# Patient Record
Sex: FEMALE | URBAN - METROPOLITAN AREA
[De-identification: names, ages, dates, MRNs, and addresses within clinical notes are randomized per-mention and may not be internally consistent; named-entity substitution may affect disease eponyms.]

---

## 2017-06-08 PROBLEM — Z00.00 ENCOUNTER FOR PREVENTIVE HEALTH EXAMINATION: Status: ACTIVE | Noted: 2017-06-08

## 2017-06-09 ENCOUNTER — OUTPATIENT (OUTPATIENT)
Dept: OUTPATIENT SERVICES | Facility: HOSPITAL | Age: 62
LOS: 1 days | End: 2017-06-09
Payer: COMMERCIAL

## 2017-06-09 ENCOUNTER — APPOINTMENT (OUTPATIENT)
Dept: ULTRASOUND IMAGING | Facility: CLINIC | Age: 62
End: 2017-06-09

## 2017-06-09 DIAGNOSIS — Z00.8 ENCOUNTER FOR OTHER GENERAL EXAMINATION: ICD-10-CM

## 2017-06-09 PROCEDURE — 76700 US EXAM ABDOM COMPLETE: CPT

## 2017-06-16 ENCOUNTER — RESULT REVIEW (OUTPATIENT)
Age: 62
End: 2017-06-16

## 2017-06-16 ENCOUNTER — INPATIENT (INPATIENT)
Facility: HOSPITAL | Age: 62
LOS: 2 days | Discharge: HOME | End: 2017-06-19
Attending: INTERNAL MEDICINE | Admitting: INTERNAL MEDICINE
Payer: COMMERCIAL

## 2017-06-16 VITALS — WEIGHT: 130.07 LBS | HEIGHT: 68 IN

## 2017-06-16 DIAGNOSIS — Z29.9 ENCOUNTER FOR PROPHYLACTIC MEASURES, UNSPECIFIED: ICD-10-CM

## 2017-06-16 DIAGNOSIS — R14.0 ABDOMINAL DISTENSION (GASEOUS): ICD-10-CM

## 2017-06-16 DIAGNOSIS — R63.4 ABNORMAL WEIGHT LOSS: ICD-10-CM

## 2017-06-16 DIAGNOSIS — Z90.89 ACQUIRED ABSENCE OF OTHER ORGANS: Chronic | ICD-10-CM

## 2017-06-16 LAB
ALBUMIN SERPL ELPH-MCNC: 2.8 G/DL — LOW (ref 3.3–5)
ALP SERPL-CCNC: 88 U/L — SIGNIFICANT CHANGE UP (ref 40–120)
ALT FLD-CCNC: 9 U/L — LOW (ref 12–78)
ANION GAP SERPL CALC-SCNC: 6 MMOL/L — SIGNIFICANT CHANGE UP (ref 5–17)
APPEARANCE UR: (no result)
APTT BLD: 28.9 SEC — SIGNIFICANT CHANGE UP (ref 27.5–37.4)
AST SERPL-CCNC: 24 U/L — SIGNIFICANT CHANGE UP (ref 15–37)
BACTERIA # UR AUTO: (no result)
BASOPHILS # BLD AUTO: 0 K/UL — SIGNIFICANT CHANGE UP (ref 0–0.2)
BASOPHILS NFR BLD AUTO: 0.4 % — SIGNIFICANT CHANGE UP (ref 0–2)
BILIRUB SERPL-MCNC: 0.5 MG/DL — SIGNIFICANT CHANGE UP (ref 0.2–1.2)
BILIRUB UR-MCNC: NEGATIVE — SIGNIFICANT CHANGE UP
BLD GP AB SCN SERPL QL: SIGNIFICANT CHANGE UP
BUN SERPL-MCNC: 9 MG/DL — SIGNIFICANT CHANGE UP (ref 7–23)
CALCIUM SERPL-MCNC: 8.8 MG/DL — SIGNIFICANT CHANGE UP (ref 8.5–10.1)
CHLORIDE SERPL-SCNC: 101 MMOL/L — SIGNIFICANT CHANGE UP (ref 96–108)
CO2 SERPL-SCNC: 28 MMOL/L — SIGNIFICANT CHANGE UP (ref 22–31)
COD CRY URNS QL: (no result)
COLOR SPEC: (no result)
CREAT SERPL-MCNC: 0.68 MG/DL — SIGNIFICANT CHANGE UP (ref 0.5–1.3)
DIFF PNL FLD: (no result)
EOSINOPHIL # BLD AUTO: 0.1 K/UL — SIGNIFICANT CHANGE UP (ref 0–0.5)
EOSINOPHIL NFR BLD AUTO: 0.9 % — SIGNIFICANT CHANGE UP (ref 0–6)
EPI CELLS # UR: SIGNIFICANT CHANGE UP
GLUCOSE SERPL-MCNC: 117 MG/DL — HIGH (ref 70–99)
GLUCOSE UR QL: NEGATIVE MG/DL — SIGNIFICANT CHANGE UP
HCT VFR BLD CALC: 34.2 % — LOW (ref 34.5–45)
HGB BLD-MCNC: 11.2 G/DL — LOW (ref 11.5–15.5)
INR BLD: 1.2 RATIO — HIGH (ref 0.88–1.16)
KETONES UR-MCNC: (no result)
LEUKOCYTE ESTERASE UR-ACNC: (no result)
LYMPHOCYTES # BLD AUTO: 0.6 K/UL — LOW (ref 1–3.3)
LYMPHOCYTES # BLD AUTO: 6.7 % — LOW (ref 13–44)
MCHC RBC-ENTMCNC: 27.2 PG — SIGNIFICANT CHANGE UP (ref 27–34)
MCHC RBC-ENTMCNC: 32.8 GM/DL — SIGNIFICANT CHANGE UP (ref 32–36)
MCV RBC AUTO: 82.8 FL — SIGNIFICANT CHANGE UP (ref 80–100)
MONOCYTES # BLD AUTO: 0.3 K/UL — SIGNIFICANT CHANGE UP (ref 0–0.9)
MONOCYTES NFR BLD AUTO: 3.5 % — SIGNIFICANT CHANGE UP (ref 2–14)
NEUTROPHILS # BLD AUTO: 8.2 K/UL — HIGH (ref 1.8–7.4)
NEUTROPHILS NFR BLD AUTO: 88.4 % — HIGH (ref 43–77)
NITRITE UR-MCNC: NEGATIVE — SIGNIFICANT CHANGE UP
PH UR: 5 — SIGNIFICANT CHANGE UP (ref 5–8)
PLATELET # BLD AUTO: 358 K/UL — SIGNIFICANT CHANGE UP (ref 150–400)
POTASSIUM SERPL-MCNC: 4.3 MMOL/L — SIGNIFICANT CHANGE UP (ref 3.5–5.3)
POTASSIUM SERPL-SCNC: 4.3 MMOL/L — SIGNIFICANT CHANGE UP (ref 3.5–5.3)
PROT SERPL-MCNC: 7.5 GM/DL — SIGNIFICANT CHANGE UP (ref 6–8.3)
PROT UR-MCNC: 30 MG/DL
PROTHROM AB SERPL-ACNC: 13 SEC — HIGH (ref 9.8–12.7)
RBC # BLD: 4.13 M/UL — SIGNIFICANT CHANGE UP (ref 3.8–5.2)
RBC # FLD: 12.9 % — SIGNIFICANT CHANGE UP (ref 10.3–14.5)
RBC CASTS # UR COMP ASSIST: SIGNIFICANT CHANGE UP /HPF (ref 0–4)
SODIUM SERPL-SCNC: 135 MMOL/L — SIGNIFICANT CHANGE UP (ref 135–145)
SP GR SPEC: 1.02 — SIGNIFICANT CHANGE UP (ref 1.01–1.02)
TYPE + AB SCN PNL BLD: SIGNIFICANT CHANGE UP
UROBILINOGEN FLD QL: 1 MG/DL
WBC # BLD: 9.3 K/UL — SIGNIFICANT CHANGE UP (ref 3.8–10.5)
WBC # FLD AUTO: 9.3 K/UL — SIGNIFICANT CHANGE UP (ref 3.8–10.5)
WBC UR QL: SIGNIFICANT CHANGE UP

## 2017-06-16 PROCEDURE — 74177 CT ABD & PELVIS W/CONTRAST: CPT | Mod: 26

## 2017-06-16 PROCEDURE — 71020: CPT | Mod: 26

## 2017-06-16 PROCEDURE — 99285 EMERGENCY DEPT VISIT HI MDM: CPT

## 2017-06-16 PROCEDURE — 71250 CT THORAX DX C-: CPT | Mod: 26

## 2017-06-16 RX ORDER — ONDANSETRON 8 MG/1
4 TABLET, FILM COATED ORAL EVERY 6 HOURS
Qty: 0 | Refills: 0 | Status: DISCONTINUED | OUTPATIENT
Start: 2017-06-16 | End: 2017-06-19

## 2017-06-16 RX ORDER — SENNA PLUS 8.6 MG/1
2 TABLET ORAL AT BEDTIME
Qty: 0 | Refills: 0 | Status: DISCONTINUED | OUTPATIENT
Start: 2017-06-16 | End: 2017-06-19

## 2017-06-16 RX ORDER — ACETAMINOPHEN 500 MG
650 TABLET ORAL EVERY 6 HOURS
Qty: 0 | Refills: 0 | Status: DISCONTINUED | OUTPATIENT
Start: 2017-06-16 | End: 2017-06-19

## 2017-06-16 RX ORDER — HEPARIN SODIUM 5000 [USP'U]/ML
5000 INJECTION INTRAVENOUS; SUBCUTANEOUS EVERY 8 HOURS
Qty: 0 | Refills: 0 | Status: DISCONTINUED | OUTPATIENT
Start: 2017-06-16 | End: 2017-06-19

## 2017-06-16 RX ORDER — DOCUSATE SODIUM 100 MG
100 CAPSULE ORAL THREE TIMES A DAY
Qty: 0 | Refills: 0 | Status: DISCONTINUED | OUTPATIENT
Start: 2017-06-16 | End: 2017-06-19

## 2017-06-16 RX ADMIN — HEPARIN SODIUM 5000 UNIT(S): 5000 INJECTION INTRAVENOUS; SUBCUTANEOUS at 22:07

## 2017-06-16 NOTE — H&P ADULT - ASSESSMENT
62 y/o F w/ no known PMHx was referred to the ED by her GYN for further evaluation of abdominal distention, fatigue, and weight loss (>10lbs.) over the last several months. CT Chest/ABD/Pel in the ED revealed evidence for metastatic disease; large B/L ovarian masses with widespread peritoneal carcinomatosis, massive ascites, and B/L lower lobe nodules. The patient denies any associated  hematochezia, melena, dysuria, fever, chills, or n/v/d.

## 2017-06-16 NOTE — ED STATDOCS - PROGRESS NOTE DETAILS
61 yr. old female PMH:  presents r to ED with abdominal distention for months worsening. Reports fatigue Seen by GYN today and instructed to go to ED for further evaluation. Seen and examined by attending in Intake. Plan: IV, fluids and labs CT abd/pelvis. Will F/U with results and re evaluate. Patient informed of results of CT revealing ovarian masses and ascites. Informed of admission for work up with Dr. Boston. Josefa MAN 61 yr. old female PMH:  presents r to ED with abdominal distention for several months and worsening. Reports fatigue Seen by GYN today and instructed to go to ED for further evaluation. No fever or chills. No abdominal pain. No N/V or diarrhea. No blood in stools. No dysuria. Seen and examined by attending in Intake. Plan: IV, fluids and labs CT abd/pelvis. Will F/U with results and re evaluate.

## 2017-06-16 NOTE — H&P ADULT - NSHPPHYSICALEXAM_GEN_ALL_CORE
Vital Signs Last 24 Hrs  T(C): 36.4, Max: 36.4 (06-16 @ 13:27)  T(F): 97.6, Max: 97.6 (06-16 @ 13:27)  HR: 117 (117 - 117)  BP: 141/80 (141/80 - 141/80)  RR: 18 (18 - 18)  SpO2: 100% (100% - 100%)

## 2017-06-16 NOTE — ED STATDOCS - OBJECTIVE STATEMENT
62 y/o F presents to the ED c/o abd distention x months. Pt also c/o fatigue. Pt has had worsening abd distention for several months, went to see GYN today and was told to come to ED for further evaluation. She also notes 10 lb weight loss in past couple of months. Currently pt has no other complaints and denies blood in stool, melena, dysuria, fever, chills, and n/v/d.

## 2017-06-16 NOTE — ED STATDOCS - ATTENDING CONTRIBUTION TO CARE
I, Marty Almanza, performed the initial face to face bedside interview with this patient regarding history of present illness, review of symptoms and relevant past medical, social and family history.  I completed an independent physical examination.  I was the initial provider who evaluated this patient. I have signed out the follow up of any pending tests (i.e. labs, radiological studies) to the ACP.  I have communicated the patient’s plan of care and disposition with the ACP.  The history, relevant review of systems, past medical and surgical history, medical decision making, and physical examination was documented by the scribe in my presence and I attest to the accuracy of the documentation.

## 2017-06-16 NOTE — H&P ADULT - HISTORY OF PRESENT ILLNESS
60 y/o F w/ no known PMHx was referred to the ED by her GYN for further evaluation of abdominal distention, fatigue, and weight loss (>10lbs.) over the last several months. CT Chest/ABD/Pel in the ED revealed evidence for metastatic disease; large B/L ovarian massess with widespread peritoneal carcinomatosis, massive ascites, and B/L lower lobe nodules. The patient denies any associated  hematochezia, melena, dysuria, fever, chills, or n/v/d.

## 2017-06-16 NOTE — H&P ADULT - PROBLEM SELECTOR PLAN 1
~admit to Medicine  ~f/u w/ GYN/ONC ~admit to Medicine  ~f/u w/ Oncology  ~f/u CEA  ~f/u CA-125  ~plan is for possible (CT vs. U/S) guided biopsy on Monday  ~NPO after MN on Sunday  ~Palliative care consult ~admit to Medicine  ~f/u w/ Oncology for further management of EOC with peritoneal carcinomatosis.  ~f/u CEA  ~f/u CA-125  ~plan is for possible (CT vs. U/S) guided biopsy on Monday   ~NPO after MN on Sunday  ~Palliative care consult

## 2017-06-16 NOTE — ED STATDOCS - MEDICAL DECISION MAKING DETAILS
suspect ovarian cancer given weight loss, ascites and abdominal pain.  Pt has no h/o alcohol abuse or liver disease.

## 2017-06-16 NOTE — ED STATDOCS - ENMT, MLM
Nasal mucosa clear.  Mouth with mildly dry mucosa  Throat has no vesicles, no oropharyngeal exudates and uvula is midline.

## 2017-06-17 DIAGNOSIS — R18.0 MALIGNANT ASCITES: ICD-10-CM

## 2017-06-17 LAB
ALBUMIN SERPL ELPH-MCNC: 2.4 G/DL — LOW (ref 3.3–5)
ALP SERPL-CCNC: 80 U/L — SIGNIFICANT CHANGE UP (ref 40–120)
ALT FLD-CCNC: 13 U/L — SIGNIFICANT CHANGE UP (ref 12–78)
ANION GAP SERPL CALC-SCNC: 7 MMOL/L — SIGNIFICANT CHANGE UP (ref 5–17)
AST SERPL-CCNC: 23 U/L — SIGNIFICANT CHANGE UP (ref 15–37)
BASOPHILS # BLD AUTO: 0.1 K/UL — SIGNIFICANT CHANGE UP (ref 0–0.2)
BASOPHILS NFR BLD AUTO: 0.8 % — SIGNIFICANT CHANGE UP (ref 0–2)
BILIRUB SERPL-MCNC: 0.6 MG/DL — SIGNIFICANT CHANGE UP (ref 0.2–1.2)
BUN SERPL-MCNC: 7 MG/DL — SIGNIFICANT CHANGE UP (ref 7–23)
CALCIUM SERPL-MCNC: 8.5 MG/DL — SIGNIFICANT CHANGE UP (ref 8.5–10.1)
CANCER AG125 SERPL-ACNC: 1934 U/ML — HIGH
CEA SERPL-MCNC: 1389 NG/ML — HIGH (ref 0–3.8)
CEA SERPL-MCNC: 1641 NG/ML — HIGH (ref 0–3.8)
CHLORIDE SERPL-SCNC: 103 MMOL/L — SIGNIFICANT CHANGE UP (ref 96–108)
CO2 SERPL-SCNC: 27 MMOL/L — SIGNIFICANT CHANGE UP (ref 22–31)
CREAT SERPL-MCNC: 0.52 MG/DL — SIGNIFICANT CHANGE UP (ref 0.5–1.3)
EOSINOPHIL # BLD AUTO: 0.1 K/UL — SIGNIFICANT CHANGE UP (ref 0–0.5)
EOSINOPHIL NFR BLD AUTO: 2.1 % — SIGNIFICANT CHANGE UP (ref 0–6)
GLUCOSE SERPL-MCNC: 91 MG/DL — SIGNIFICANT CHANGE UP (ref 70–99)
HCT VFR BLD CALC: 31.7 % — LOW (ref 34.5–45)
HGB BLD-MCNC: 10.3 G/DL — LOW (ref 11.5–15.5)
LYMPHOCYTES # BLD AUTO: 0.5 K/UL — LOW (ref 1–3.3)
LYMPHOCYTES # BLD AUTO: 8.3 % — LOW (ref 13–44)
MAGNESIUM SERPL-MCNC: 2.2 MG/DL — SIGNIFICANT CHANGE UP (ref 1.6–2.6)
MCHC RBC-ENTMCNC: 27.3 PG — SIGNIFICANT CHANGE UP (ref 27–34)
MCHC RBC-ENTMCNC: 32.6 GM/DL — SIGNIFICANT CHANGE UP (ref 32–36)
MCV RBC AUTO: 83.8 FL — SIGNIFICANT CHANGE UP (ref 80–100)
MONOCYTES # BLD AUTO: 0.3 K/UL — SIGNIFICANT CHANGE UP (ref 0–0.9)
MONOCYTES NFR BLD AUTO: 4 % — SIGNIFICANT CHANGE UP (ref 2–14)
NEUTROPHILS # BLD AUTO: 5.6 K/UL — SIGNIFICANT CHANGE UP (ref 1.8–7.4)
NEUTROPHILS NFR BLD AUTO: 84.9 % — HIGH (ref 43–77)
PLATELET # BLD AUTO: 361 K/UL — SIGNIFICANT CHANGE UP (ref 150–400)
POTASSIUM SERPL-MCNC: 4.2 MMOL/L — SIGNIFICANT CHANGE UP (ref 3.5–5.3)
POTASSIUM SERPL-SCNC: 4.2 MMOL/L — SIGNIFICANT CHANGE UP (ref 3.5–5.3)
PROT SERPL-MCNC: 6.6 GM/DL — SIGNIFICANT CHANGE UP (ref 6–8.3)
RBC # BLD: 3.79 M/UL — LOW (ref 3.8–5.2)
RBC # FLD: 13.1 % — SIGNIFICANT CHANGE UP (ref 10.3–14.5)
SODIUM SERPL-SCNC: 137 MMOL/L — SIGNIFICANT CHANGE UP (ref 135–145)
WBC # BLD: 6.6 K/UL — SIGNIFICANT CHANGE UP (ref 3.8–10.5)
WBC # FLD AUTO: 6.6 K/UL — SIGNIFICANT CHANGE UP (ref 3.8–10.5)

## 2017-06-17 RX ADMIN — HEPARIN SODIUM 5000 UNIT(S): 5000 INJECTION INTRAVENOUS; SUBCUTANEOUS at 13:26

## 2017-06-17 RX ADMIN — HEPARIN SODIUM 5000 UNIT(S): 5000 INJECTION INTRAVENOUS; SUBCUTANEOUS at 22:16

## 2017-06-17 RX ADMIN — HEPARIN SODIUM 5000 UNIT(S): 5000 INJECTION INTRAVENOUS; SUBCUTANEOUS at 05:39

## 2017-06-17 NOTE — CONSULT NOTE ADULT - ASSESSMENT
60 yo female with abdominal distension, ascites, scans revealing bilateral ovarian masses, omental metastases, ascites, pulmonary nodules; has anemia, elevated CEA;  pending; ECOG PS 2    Differential diagnosis includes ovarian cancer/ colon cancer with Krukenberg metastases, lung metastases; primary lung very unlikely.    PLAN      Stool occult blood  Iron studies  IR evaluation for paracentesis, cell block analysis, omental biopsy  GYN evaluation: seen by Dr Boyce as out patient  DVT prophylaxis

## 2017-06-17 NOTE — CHART NOTE - NSCHARTNOTEFT_GEN_A_CORE
Upon Nutritional Assessment by the Registered Dietitian your patient was determined to meet criteria has evidence of the following diagnosis/diagnoses:        [ ]  Mild Protein Calorie Malnutrition        [ ]  Moderate Protein Calorie Malnutrition        [x ] Severe Protein Calorie Malnutrition        [ ] Unspecified Protein Calorie Malnutrition    Findings as based on:  •  Comprehensive nutrition assessment and Nutrition focused physical examination  •  Insufficient food/energy intake  •  Weight loss over time(Please specify time period)  •  Loss of muscle mass  •  Loss of fat mass  •  Fluid accumulation    Findings relevant to patient:  1.severe/moderate muscle wasting  2.severe/moderate fat loss  3 edema/ascites  4. possible weight loss (masked by fluid)    Nutrition Interventions:  1. Prosource 1oz. po TID mixed with juice  2. Ensure enlive 8oz. po BID   3.     TO BE COMPLETED BY PROVIDER:          [ ] Agree:         [ ] Disagree:    Comments:

## 2017-06-17 NOTE — DIETITIAN INITIAL EVALUATION ADULT. - OTHER INFO
Nutrition consult received secondary to unintentional weight loss. Pt reports appetite good (>50%) tolerating po diet (regular). Current weight UBW however suspect weight loss secondary to ascites and edema. No diet PTA followed or supplementation consumed. Skin intact- +2 R/L foot edema, +Ascites. Denies any difficulty chewing or swallowing. NFPE reveals severe muscle wasting: Temporal, clavicle, scapula. Moderate muscle wasting: calf, patella, quads. Severe fat loss: tricep, ribs. Moderate fat loss: ocular. Pt meets criteria for severe protein/calorie malnutrition in context of chronic illness secondary to Severe muscle and fat wasting, and severe fluid accumulation. Suggest additional protein and calories w/ Ensure enlive 8oz. po BID and Prosource 1oz. po mixed with juice TID.

## 2017-06-17 NOTE — DIETITIAN INITIAL EVALUATION ADULT. - NS AS NUTRI DX NUTRIENT
Pt meets criteria for severe protein/calorie malnutrition in context of chronic illness secondary to Severe muscle and fat wasting, and severe fluid accumulation./Malnutrition

## 2017-06-18 RX ADMIN — HEPARIN SODIUM 5000 UNIT(S): 5000 INJECTION INTRAVENOUS; SUBCUTANEOUS at 15:35

## 2017-06-18 RX ADMIN — HEPARIN SODIUM 5000 UNIT(S): 5000 INJECTION INTRAVENOUS; SUBCUTANEOUS at 05:51

## 2017-06-18 RX ADMIN — HEPARIN SODIUM 5000 UNIT(S): 5000 INJECTION INTRAVENOUS; SUBCUTANEOUS at 21:03

## 2017-06-18 NOTE — GOALS OF CARE CONVERSATION - PERSONAL ADVANCE DIRECTIVE - TREATMENT GUIDELINE COMMENT
full code, interested in hearing about all interventions currently. Open to home palliative to check in for sx management should any sx arise. She would never want to be kept alive on machines or in a state where she could not interact meaningfully with her family

## 2017-06-18 NOTE — GOALS OF CARE CONVERSATION - PERSONAL ADVANCE DIRECTIVE - WHAT MATTERS MOST
Her family, comfort, honesty, and having QOL. Described watching process of parents dying (mother of cancer and then more recently her father from Parkinson's). Thus she has had time to think over what she would want for herself and has already begun to get her affairs in order for her family. She has aman in God and finds it helpful to cope. Her family is paramount and she mentions often weighing her options in regard to keeping them from being burdened.

## 2017-06-18 NOTE — CONSULT NOTE ADULT - SUBJECTIVE AND OBJECTIVE BOX
HPI:  62 y/o F w/ no known PMHx was referred to the ED by her GYN for further evaluation of abdominal distention, fatigue, and weight loss (>10lbs.) over the last several months. She was seen by Dr Boyce for bloating; GYN exam was normal although adnexa not palpable secondary to ascites. Given the degree of ascites she was advised admission for [facility] / management . CT Chest/ABD/Pel in the ED revealed evidence for metastatic disease; large B/L ovarian massess with widespread peritoneal carcinomatosis, massive ascites, and B/L lower lobe nodules. The patient denies dysphagia, reflux , change in bowel habits, blood in stools , spotting;  No hematochezia, melena, dysuria, fever, chills, or n/v/d. (2017 17:35); labs reveal normochromic and normocytic anemia, elevated CEA ;  pending      PAST MEDICAL & SURGICAL HISTORY:  History of tonsillectomy      MEDICATIONS  (STANDING):  heparin  Injectable 5000Unit(s) SubCutaneous every 8 hours    MEDICATIONS  (PRN):  acetaminophen   Tablet 650milliGRAM(s) Oral every 6 hours PRN For Temp greater than 38 C (100.4 F)  acetaminophen   Tablet. 650milliGRAM(s) Oral every 6 hours PRN Mild Pain (1 - 3)  ondansetron Injectable 4milliGRAM(s) IV Push every 6 hours PRN Nausea  senna 2Tablet(s) Oral at bedtime PRN Constipation  docusate sodium 100milliGRAM(s) Oral three times a day PRN Constipation      Allergies    No Known Allergies    Intolerances        SOCIAL HISTORY:    FAMILY HISTORY:  Family history of esophageal cancer (Mother)  Family history of Parkinson disease (Father)  Family history of prostate cancer (Father)  Family history of hypertension (Father)  No pertinent family history in first degree relatives      REVIEW OF SYSTEMS      General: Good appetite,10 lb  weight loss;  active;  able to care for self    Skin: No rash, no pruritis, no hives  	  Ophthalmologic: No visual blurring, no diplopia  	  HEENT: No neck pains, sore throat, hearing difficulty, tinnitus    Respiratory and Thorax:  No dyspnea, cough, sputum production, hoarseness, hemoptysis. No pleurisy, chest pains  	  Cardiovascular: No palpitations, chest pains, dyspnea on exertion; no PND, orthopnea    Gastrointestinal:  bloating. No reflux symptoms, dysphagia; No change in bowel habits,   diarrhea,constipation;No blood in stools    Genitourinary: No dysuria, frequency, hematuria. No flank pains    Musculoskeletal: No musculoskeletal pains, no joint pains or swelling    Neurological: No headaches, diplopia, dysarthria, dysphagia, weakness, parasthesia	    Psychiatric: No depression, anxiety	    Hematology/Lymphatics: No swollen glands, masses, edema	    Endocrine:	No hot flashes    Allergic/Immunologic:	Negative    GYN no spoting    Vital Signs Last 24 Hrs  T(C): 36.9, Max: 36.9 ( @ 04:39)  T(F): 98.5, Max: 98.5 ( @ 04:39)  HR: 92 (92 - 117)  BP: 128/71 (128/71 - 148/73)  BP(mean): --  RR: 16 (16 - 18)  SpO2: 92% (92% - 100%)    PHYSICAL EXAM:      Constitutional: Appears comfortable, in  NAD, A/O X 3   Cachectic    Eyes: EOMI, PERRLA ;No icterus    ENMT:  No oral lesions, thrush; pharynx not injected    Neck:  Supple; No masses, lymph nodes, no bruits    Breasts: Neg masses    Back: No tenderness     Respiratory:  Clear to A/P, No wheezes, rhonchi, rales. No dullness to percussion    Cardiovascular: Normal rate and rhythm; normal S1 and S2; No murmurs. No gallop    Gastrointestinal: marked  distension, tense ascites/ fluid wave, umbilical protrusion  decreased  bowl sounds; No tenderness , guarding, rebound;  no masses, no hepatosplenomegaly    Genitourinary: No flank pains, no inguinal adenopathy    Rectal: NA    Extremities:  No phlebitis, no edema    Vascular: No acrocyanosis, no edema    Neurological: Alert and oriented. Cranial nerves II-XII WNL, Upper extremity / lower extremity  strength WNL;  Reflexes WNL, Plantar downgoing ; No cerebellar signs    Skin: No rash, petechiae purpura, ecchymoses    Lymph Nodes: No cervical, supraclavicular, axillary, inguinal adenopathy    Musculoskeletal: No joint swelling    Psychiatric: Alert, oriented, normal affect      LABS:                        10.3   6.6   )-----------( 361      ( 2017 06:07 )             31.7     06-17    137  |  103  |  7   ----------------------------<  91  4.2   |  27  |  0.52    Ca    8.5      2017 06:07  Mg     2.2         TPro  6.6  /  Alb  2.4<L>  /  TBili  0.6  /  DBili  x   /  AST  23  /  ALT  13  /  AlkPhos  80      PT/INR - ( 2017 14:02 )   PT: 13.0 sec;   INR: 1.20 ratio         PTT - ( 2017 14:02 )  PTT:28.9 sec  Urinalysis Basic - ( 2017 14:20 )    Color: Lindsey / Appearance: Slightly Turbid / S.020 / pH: x  Gluc: x / Ketone: Trace  / Bili: Negative / Urobili: 1 mg/dL   Blood: x / Protein: 30 mg/dL / Nitrite: Negative   Leuk Esterase: Trace / RBC: 0-2 /HPF / WBC 3-5   Sq Epi: x / Non Sq Epi: Few / Bacteria: Few        RADIOLOGY & ADDITIONAL STUDIES:    EXAM:  CT ABDOMEN AND PELVIS IC                            PROCEDURE DATE:  2017        INTERPRETATION:  Clinical information: Ascites and weight loss. Rule out   ovarian cancer.    COMPARISON: None    PROCEDURE:   CT of the Abdomen and Pelvis was performed with intravenous contrast.   Intravenous contrast: 90 ml Omnipaque 350. 10 ml discarded.  Oral contrast: positive contrast was administered.  Sagittal and coronal reformats were performed.    FINDINGS:    LOWER CHEST: 12 mm left lower lobe pulmonary nodule. 4 mm right lower   lobe pulmonary nodule.    LIVER: Within normal limits.  SPLEEN: Within normal limits.  PANCREAS: Within normal limits.  GALLBLADDER: Within normal limits.  BILE DUCTS: Normal caliber.  ADRENALS: Within normal limits.  KIDNEYS/URETERS: 13 mm hypodense right renal lesion, too small for   accurate characterization.    RETROPERITONEUM: No lymphadenopathy.    VESSELS:  Within normal limits.    BOWEL: No bowel obstruction. Appendix normal.  PERITONEUM: Massive ascites. Soft tissue infiltration of the omentum and   numerous nodules along the lining of the peritoneum.    REPRODUCTIVE ORGANS: Bilateral complex cystic/solid adnexal masses,   measuring 11.2 cm on the right and 6.6 cm on the left. Unremarkable   uterus.  BLADDER: Within normal limits.    ABDOMINAL WALL: Within normal limits.  BONES: No acute bony abnormality.    IMPRESSION: Large complex bilateral ovarian masses suspicious for   malignant neoplasms.  Widespread peritoneal carcinomatosis and massive ascites.  Nodules at the lung bases. Recommend chest CT.    Findings discussed with Dr. Miramontes on 2017.    INTERPRETATION:  Exam Date: 2017 4:45 PM  Clinical Information: Nodule seen in the lung bases on CT abdomen same   day. Metastatic ovarian cancer seen on CT same day  Technique:       CT of the chest was performed with axial images obtained   from the thoracic to the inlet to the bilateral adrenal glands       without IV contrast. Maximum intensity projection images were obtained.  Comparison:  ct abdomen same day    FINDINGS:    Lungs, Airways and Pleura: Central tracheobronchial tree is grossly   patent. No endobronchial lesions. Biapical pleural parenchymal scarring.   Right lower lobe platelike atelectasis and/or scarring. Left lower lobe   nodular opacity measuring 1.4 cm. Anterior right lower lobe nodule   measuring 0.6 cm, series 2 image 60. Biapical pleural parenchymal   scarring.    Heart and Great Vessels: Great vessels are unremarkable. Heart is normal   in size. No pericardial effusions.    Mediastinum and Evie: within normal limits    Neck and Chest Wall: within normal limits    Bones: Mild degenerative changes.    Upper Abdomen:  Partially visualized large ascites as seen on CT same day.    IMPRESSION:         Bilateral lower lobe nodules as described above concerning for metastatic   disease given findings in the abdomen and pelvis. Primary malignancy in   the left lower lobe cannot entirely be excluded however. Less likely.
HPI: Ms. Boykin is a 60 yo F w/ no PMHx admitted  from Gyn office for increasing abdominal distention, fatigue, and >10lb weight loss in the last 6 months. Pt found to have CT chest/abd/pelvis showing bl ovarian masses w/ widespread carcinomatosis, massive ascites and bl lower lobe nodules elevated CEA and . Palliative care consulted to establish care and discuss GOC.    Met Ms. Boykin this am, no family at bedside. Pt open to talking. She denied any pain or discomfort, breathing well. Decreased appetite due to fullness from abdominal distention, but not bothered by this.     Pt understands the possibility that she had cancer, but is not fretful about this at this time. She feels that until she knows for sure what we are dealing with, with completed workup, she will not worry, but rather try to stay positive. She explained having experience with difficult medical issues, having helped her mother through esophageal cancer (passed in early  with comfort care) and her father through end stage parkinson's ( in ). Thus, she is already abreast of the need to get her affairs in order, choosing a proxy, and making her wishes for her care known. She is working with her brother, whom she lives with to arrange this with a , not interested in establishing proxy paperwork with us right now, but will share what she has done with  upon completion.     We talked about her life prior to this and what she considers QOL. She was very thoughtful about this topic, explaining often that as she has aged she has learned to be present and thankful for her life until this point. She works as a  currently and has been so focused on her work that she did not pursue the changes happening in her body until now, mainly because she had no overt symptoms of concern. She notes she lives her life the best ways she can, enjoying her work and her family, who are the most important thing to her. She has 2 sisters and a brother, her brother and one sister who lives nearby know about what is happening to her. She plans to inform everyone once she has more information, not wanting to worry them before having the full picture. She mentioned someone asking her about her code status, noting that her mother survived a code and intubation with cancer, and at this point she would like a trial. However, she was clear that if she was bed bound and unable to interact with the world, dependant on machines, she would not consider this QOL, plans to share her wishes with her loved ones.     For now, the pt is hopeful to get further evaluation, make a plan with Dr. Read (noting she appreciates his honesty and plan to proceed after first being sure about what is being treated), open to home pall to check on symptoms and wants to take things one step at a time. She has aman and hope to have some options, but also notes that she will deal with whatever information is shared. She hopes to go home tomorrow. She is open to our continued support.      PAIN: ( )Yes   ( x)No  Level:  Location:  Intensity:    /10  Quality:  Aggravating Factors:  Alleviating Factors:  Radiation:  Duration/Timing:  Impact on ADLs:    DYSPNEA: ( ) Yes  (x ) No  Level:    PAST MEDICAL & SURGICAL HISTORY:  History of tonsillectomy      SOCIAL HX: Lives at home with brother, works as     Hx opiate tolerance ( )YES  (x )NO    Baseline ADLs  (Prior to Admission)  (x ) Independent   ( )Dependent    FAMILY HISTORY:  Family history of esophageal cancer (Mother)  Family history of Parkinson disease (Father)  Family history of prostate cancer (Father)  Family history of hypertension (Father)  No pertinent family history in first degree relatives      Review of Systems:    Anxiety- denies  Depression- denies  Physical Discomfort- denies  Dyspnea- denies  Constipation- denies  Diarrhea- denies  Nausea- denies  Vomiting- denies  Anorexia- yes, due to early satiety  Weight Loss- yes >10 lbs  Cough- no  Secretions- no  Fatigue- yes  Weakness-no  Delirium- no    All other systems reviewed and negative      PHYSICAL EXAM:    Vital Signs Last 24 Hrs  T(C): 36.8, Max: 37.5 (-17 @ 20:30)  T(F): 98.3, Max: 99.5 (-17 @ 20:30)  HR: 89 (89 - 109)  BP: 115/54 (115/54 - 132/57)  BP(mean): --  RR: 17 (16 - 17)  SpO2: 94% (94% - 96%)  Daily     Daily     PPSV2: 50-60  %  FAST:    General: Middle aged woman, thin cachectic, temporal wasting, pleasant, NAD, sitting up in chair  Mental Status: AOx3  HEENT: mmm, perrl, eomi, ncat,   Lungs: dec at bases bl  Cardiac: +s1 s2 rrr  GI: distended, tense, umbilical protrusion, no tenderness  : voiding  Ext: + pitting edema bl LE, thin with muslce wasting throughout otherwise  Neuro: no focal deficits      LABS:                        10.3   6.6   )-----------( 361      ( 2017 06:07 )             31.7         137  |  103  |  7   ----------------------------<  91  4.2   |  27  |  0.52    Ca    8.5      2017 06:07  Mg     2.2         TPro  6.6  /  Alb  2.4<L>  /  TBili  0.6  /  DBili  x   /  AST  23  /  ALT  13  /  AlkPhos  80      PT/INR - ( 2017 14:02 )   PT: 13.0 sec;   INR: 1.20 ratio         PTT - ( 2017 14:02 )  PTT:28.9 sec  Albumin: Albumin, Serum: 2.4 g/dL ( @ 06:07)      Allergies    No Known Allergies    Intolerances      MEDICATIONS  (STANDING):  heparin  Injectable 5000Unit(s) SubCutaneous every 8 hours    MEDICATIONS  (PRN):  acetaminophen   Tablet 650milliGRAM(s) Oral every 6 hours PRN For Temp greater than 38 C (100.4 F)  acetaminophen   Tablet. 650milliGRAM(s) Oral every 6 hours PRN Mild Pain (1 - 3)  ondansetron Injectable 4milliGRAM(s) IV Push every 6 hours PRN Nausea  senna 2Tablet(s) Oral at bedtime PRN Constipation  docusate sodium 100milliGRAM(s) Oral three times a day PRN Constipation      RADIOLOGY/ADDITIONAL STUDIES:    EXAM:  CT CHEST                        PROCEDURE DATE:  2017        INTERPRETATION:  Exam Date: 2017 4:45 PM  Clinical Information: Nodule seen in the lung bases on CT abdomen same   day. Metastatic ovarian cancer seen on CT same day  Technique:       CT of the chest was performed with axial images obtained   from the thoracic to the inlet to the bilateral adrenal glands       without IV contrast. Maximum intensity projection images were obtained.  Comparison:  ct abdomen same day    FINDINGS:    Lungs, Airways and Pleura: Central tracheobronchial tree is grossly   patent. No endobronchial lesions. Biapical pleural parenchymal scarring.   Right lower lobe platelike atelectasis and/or scarring. Left lower lobe   nodular opacity measuring 1.4 cm. Anterior right lower lobe nodule   measuring 0.6 cm, series 2 image 60. Biapical pleural parenchymal   scarring.    Heart and Great Vessels: Great vessels are unremarkable. Heart is normal   in size. No pericardial effusions.    Mediastinum and Evie: within normal limits    Neck and Chest Wall: within normal limits    Bones: Mild degenerative changes.    Upper Abdomen:  Partially visualized large ascites as seen on CT same day.    IMPRESSION:         Bilateral lower lobe nodules as described above concerning for metastatic   disease given findings in the abdomen and pelvis. Primary malignancy in   the left lower lobe cannot entirely be excluded however. Less likely.      ROSALEE ENGLAND M.D., ATTENDING RADIOLOGIST  This document has been electronically signed. 2017  4:56PM    EXAM:  CT ABDOMEN AND PELVIS IC                        PROCEDURE DATE:  2017    INTERPRETATION:  Clinical information: Ascites and weight loss. Rule out   ovarian cancer.    COMPARISON: None    PROCEDURE:   CT of the Abdomen and Pelvis was performed with intravenous contrast.   Intravenous contrast: 90 ml Omnipaque 350. 10 ml discarded.  Oral contrast: positive contrast was administered.  Sagittal and coronal reformats were performed.    FINDINGS:    LOWER CHEST: 12 mm left lower lobe pulmonary nodule. 4 mm right lower   lobe pulmonary nodule.    LIVER: Within normal limits.  SPLEEN: Within normal limits.  PANCREAS: Within normal limits.  GALLBLADDER: Within normal limits.  BILE DUCTS: Normal caliber.  ADRENALS: Within normal limits.  KIDNEYS/URETERS: 13 mm hypodense right renal lesion, too small for   accurate characterization.    RETROPERITONEUM: No lymphadenopathy.    VESSELS:  Within normal limits.    BOWEL: No bowel obstruction. Appendix normal.  PERITONEUM: Massive ascites. Soft tissue infiltration of the omentum and   numerous nodules along the lining of the peritoneum.    REPRODUCTIVE ORGANS: Bilateral complex cystic/solid adnexal masses,   measuring 11.2 cm on the right and 6.6 cm on the left. Unremarkable   uterus.  BLADDER: Within normal limits.    ABDOMINAL WALL: Within normal limits.  BONES: No acute bony abnormality.    IMPRESSION: Large complex bilateral ovarian masses suspicious for   malignant neoplasms.  Widespread peritoneal carcinomatosis and massive ascites.  Nodules at the lung bases. Recommend chest CT.    Findings discussed with Dr. Miramontes on 2017.      ISELA RODRIGUEZ   This document has been electronically signed. 2017  3:57PM

## 2017-06-18 NOTE — GOALS OF CARE CONVERSATION - PERSONAL ADVANCE DIRECTIVE - CONVERSATION DETAILS
Discussed current issues with pt, who was aware of possibility of malignancy. She was clear that she is coping well with this news, not currently upset or having difficulty. She attributes this attitude to desire to be positive until she has reason to think otherwise. She wants full workup and discussion of possible interventions. She is clear about what kind of life would be unacceptable to her and desires to hear the truth from her clinicians so she can make informed decisions.

## 2017-06-18 NOTE — CONSULT NOTE ADULT - ASSESSMENT
60 yo F w/ no PMHx admitted 6/16 from Gyn office for increasing abdominal distention, fatigue, and >10lb weight loss in the last 6 months. Pt found to have CT chest/abd/pelvis showing bl ovarian masses w/ widespread carcinomatosis, massive ascites, and bl lower lobe nodules elevated CEA and . Palliative care consulted to establish care and discuss GOC.    1) Ascites  - likely 2nd to underlying malignancy  - pending IR tap tomorrow  - no discomfort currently  - if needed can consider Tylenol then low dose morphine 5mg po q4h prn    2) Malnutrition  - muscle wasting and evidence of inability to maintain adequate caloric intake  - likely 2/2 to ascites and early satiety  - albumin 2.4  - PPS still good despite this    3) Likely Malignancy  - oncology notes appreciated  - CT C/A/P showing ovarian masses, and mets  - CEA and  elevated, but tissue needed for definitive diagnosis  - pending IR tap and omental biopsy tomorrow    4) Prognosis  - guarded  - need to have etiology of cancer and treatment options before elucidating this    5) GOC/Advanced Directives  - pt has capacity  - no official proxy, but will assing brother Gurpreet Boykin [phone] with , he is surrogate currently  - full code  - full discussion about GOC today: workup for cancer type and treatment plan, home with pall for sx monitoring while pursuing curative treatment, continue to provide support in the meantime. Pt has positive outlook and coping well thus far.     Thank you for including us in Ms. Boykin's care. Will continue to follow with you.    Shoshana Godinez MD  Palliative Care Attending 62 yo F w/ no PMHx admitted 6/16 from Gyn office for increasing abdominal distention, fatigue, and >10lb weight loss in the last 6 months. Pt found to have CT chest/abd/pelvis showing bl ovarian masses w/ widespread carcinomatosis, massive ascites, and bl lower lobe nodules elevated CEA and . Palliative care consulted to establish care and discuss GOC.    1) Ascites  - likely 2nd to underlying malignancy  - pending IR tap tomorrow  - no discomfort currently  - if needed can consider Tylenol then low dose morphine 5mg po q4h prn    2) Malnutrition  - muscle wasting and evidence of inability to maintain adequate caloric intake  - likely 2/2 to ascites and early satiety  - albumin 2.4  - PPS still good despite this    3) Likely Malignancy  - oncology notes appreciated  - CT C/A/P showing ovarian masses, and mets  - CEA and  elevated, but tissue needed for definitive diagnosis  - pending IR tap and omental biopsy tomorrow    4) Prognosis  - guarded  - need to have etiology of cancer and treatment options before elucidating this    5) GOC/Advanced Directives  - pt has capacity  - no official proxy, but will assing brother Gurpreet Boykin [phone] with , he is surrogate currently  - full code  - full discussion about GOC today: workup for cancer type and treatment plan, home with pall for sx monitoring while pursuing curative treatment, continue to provide support in the meantime. Pt has positive outlook and coping well thus far. *Spent 40 minutes discussing Advance care planning, the explanation and discussion of advance directives, code status and levels of home care.     Thank you for including us in Ms. Boykin's care. Will continue to follow with you.    Shoshana Godinez MD  Palliative Care Attending

## 2017-06-19 ENCOUNTER — RESULT REVIEW (OUTPATIENT)
Age: 62
End: 2017-06-19

## 2017-06-19 VITALS
SYSTOLIC BLOOD PRESSURE: 112 MMHG | TEMPERATURE: 98 F | OXYGEN SATURATION: 97 % | RESPIRATION RATE: 18 BRPM | HEART RATE: 101 BPM | DIASTOLIC BLOOD PRESSURE: 45 MMHG

## 2017-06-19 LAB
ALBUMIN FLD-MCNC: 2.7 G/DL — SIGNIFICANT CHANGE UP
AMYLASE FLD-CCNC: 49 U/L — SIGNIFICANT CHANGE UP
ANION GAP SERPL CALC-SCNC: 5 MMOL/L — SIGNIFICANT CHANGE UP (ref 5–17)
APTT BLD: 27.6 SEC — SIGNIFICANT CHANGE UP (ref 27.5–37.4)
B PERT IGG+IGM PNL SER: (no result)
BUN SERPL-MCNC: 15 MG/DL — SIGNIFICANT CHANGE UP (ref 7–23)
CALCIUM SERPL-MCNC: 8.5 MG/DL — SIGNIFICANT CHANGE UP (ref 8.5–10.1)
CHLORIDE SERPL-SCNC: 103 MMOL/L — SIGNIFICANT CHANGE UP (ref 96–108)
CO2 SERPL-SCNC: 28 MMOL/L — SIGNIFICANT CHANGE UP (ref 22–31)
COLOR FLD: YELLOW — SIGNIFICANT CHANGE UP
CREAT SERPL-MCNC: 0.54 MG/DL — SIGNIFICANT CHANGE UP (ref 0.5–1.3)
FLUID INTAKE SUBSTANCE CLASS: SIGNIFICANT CHANGE UP
FLUID SEGMENTED GRANULOCYTES: 53 % — SIGNIFICANT CHANGE UP
GLUCOSE FLD-MCNC: 98 MG/DL — SIGNIFICANT CHANGE UP
GLUCOSE SERPL-MCNC: 86 MG/DL — SIGNIFICANT CHANGE UP (ref 70–99)
GRAM STN FLD: SIGNIFICANT CHANGE UP
HCT VFR BLD CALC: 29.4 % — LOW (ref 34.5–45)
HGB BLD-MCNC: 10 G/DL — LOW (ref 11.5–15.5)
INR BLD: 1.12 RATIO — SIGNIFICANT CHANGE UP (ref 0.88–1.16)
LDH SERPL L TO P-CCNC: 330 U/L — SIGNIFICANT CHANGE UP
LYMPHOCYTES # FLD: 27 % — SIGNIFICANT CHANGE UP
MCHC RBC-ENTMCNC: 28.2 PG — SIGNIFICANT CHANGE UP (ref 27–34)
MCHC RBC-ENTMCNC: 34 GM/DL — SIGNIFICANT CHANGE UP (ref 32–36)
MCV RBC AUTO: 82.9 FL — SIGNIFICANT CHANGE UP (ref 80–100)
MONOS+MACROS # FLD: 20 % — SIGNIFICANT CHANGE UP
PLATELET # BLD AUTO: 362 K/UL — SIGNIFICANT CHANGE UP (ref 150–400)
POTASSIUM SERPL-MCNC: 3.8 MMOL/L — SIGNIFICANT CHANGE UP (ref 3.5–5.3)
POTASSIUM SERPL-SCNC: 3.8 MMOL/L — SIGNIFICANT CHANGE UP (ref 3.5–5.3)
PROT FLD-MCNC: 5.2 G/DL — SIGNIFICANT CHANGE UP
PROTHROM AB SERPL-ACNC: 12.1 SEC — SIGNIFICANT CHANGE UP (ref 9.8–12.7)
RBC # BLD: 3.55 M/UL — LOW (ref 3.8–5.2)
RBC # FLD: 12.8 % — SIGNIFICANT CHANGE UP (ref 10.3–14.5)
RCV VOL RI: 2000 /UL — HIGH (ref 0–5)
SODIUM SERPL-SCNC: 136 MMOL/L — SIGNIFICANT CHANGE UP (ref 135–145)
SPECIMEN SOURCE: SIGNIFICANT CHANGE UP
TOTAL NUCLEATED CELL COUNT, BODY FLUID: 1247 /UL — HIGH (ref 0–5)
TUBE TYPE: SIGNIFICANT CHANGE UP
WBC # BLD: 7 K/UL — SIGNIFICANT CHANGE UP (ref 3.8–10.5)
WBC # FLD AUTO: 7 K/UL — SIGNIFICANT CHANGE UP (ref 3.8–10.5)

## 2017-06-19 PROCEDURE — 88341 IMHCHEM/IMCYTCHM EA ADD ANTB: CPT | Mod: 26

## 2017-06-19 PROCEDURE — 88108 CYTOPATH CONCENTRATE TECH: CPT | Mod: 26

## 2017-06-19 PROCEDURE — 88305 TISSUE EXAM BY PATHOLOGIST: CPT | Mod: 26

## 2017-06-19 PROCEDURE — 49083 ABD PARACENTESIS W/IMAGING: CPT

## 2017-06-19 PROCEDURE — 88342 IMHCHEM/IMCYTCHM 1ST ANTB: CPT | Mod: 26

## 2017-06-19 RX ADMIN — HEPARIN SODIUM 5000 UNIT(S): 5000 INJECTION INTRAVENOUS; SUBCUTANEOUS at 17:25

## 2017-06-19 RX ADMIN — HEPARIN SODIUM 5000 UNIT(S): 5000 INJECTION INTRAVENOUS; SUBCUTANEOUS at 17:29

## 2017-06-19 NOTE — DISCHARGE NOTE ADULT - CARE PROVIDER_API CALL
Damaris Read), Hematology; Internal Medicine; Medical Oncology  [address], [place] [id number]  Phone: [phone]  Fax: [phone]    Miya Ruiz (DO), Obstetrics and Gynecology  [address] [place] [address]  Phone: [phone]  Fax: [phone]    Hrei Davis), Diagnostic Radiology; [facility]  [address] [place] [address]  Phone: [phone]  Fax: [phone]

## 2017-06-19 NOTE — DISCHARGE NOTE ADULT - PLAN OF CARE
Determine source and treat - CT abd/chest: Multiple masses in the Lungs, Ovaries, Abdomen most likely malignant in nature.    - Primary source unknown at this time  - : 1932  - CEA: 1641  - Oncology consult appreciated  - S/p Paracentesis with 6L removal and cytology  - Follow up with Dr. Read outpatient on wednesday  (6/21/2017)  - May need omental biopsy with Dr. Davis if cytology inconclusive.

## 2017-06-19 NOTE — DISCHARGE NOTE ADULT - PATIENT PORTAL LINK FT
“You can access the [facility] Patient Portal, offered by [facility], by registering with the following website: [email or web address]”

## 2017-06-19 NOTE — DISCHARGE NOTE ADULT - HOSPITAL COURSE
60 y/o F w/ no known PMHx was referred to the ED by her GYN for further evaluation of abdominal distention, fatigue, and weight loss (>10lbs.) over the last several months. CT Chest/ABD/Pel in the ED revealed evidence for metastatic disease; large B/L ovarian massess with widespread peritoneal carcinomatosis, massive ascites, and B/L lower lobe nodules.Patient had paracentesis this morning with 6L of fluid being removed and sample sent for cytology. Discussed case with Dr. Echeverria who has discussed the case with Dr. Read.  Patient is to be discharged home with follow up with Dr. Read in his office on Wednesday for follow up.  Patient may need Omental biopsy if cytology is inconclusive.  Dr. Davis will review the case tomorrow to see if there is a possibility for Omental biopsy.

## 2017-06-19 NOTE — DISCHARGE NOTE ADULT - CARE PLAN
Principal Discharge DX:	Ascites, malignant  Goal:	Determine source and treat  Instructions for follow-up, activity and diet:	- CT abd/chest: Multiple masses in the Lungs, Ovaries, Abdomen most likely malignant in nature.    - Primary source unknown at this time  - : 1932  - CEA: 1641  - Oncology consult appreciated  - S/p Paracentesis with 6L removal and cytology  - Follow up with Dr. Read outpatient on wednesday  (6/21/2017)  - May need omental biopsy with Dr. Davis if cytology inconclusive.

## 2017-06-19 NOTE — PROGRESS NOTE ADULT - SUBJECTIVE AND OBJECTIVE BOX
#D/c   #Pt has been seen and examined with FP resident, resident supervised agree with a/p       Patient is a 61y old  Female who presents with a chief complaint of abdominal swelling, physician said to come to er (16 Jun 2017 18:07)        HPI:  62 y/o F w/ no known PMHx was referred to the ED by her GYN for further evaluation of abdominal distention, fatigue, and weight loss (>10lbs.) over the last several months. CT Chest/ABD/Pel in the ED revealed evidence for metastatic disease; large B/L ovarian massess with widespread peritoneal carcinomatosis, massive ascites, and B/L lower lobe nodules.       PHYSICAL EXAM:  Vital Signs Last 24 Hrs  T(C): 36.6, Max: 37.5 (06-18 @ 14:32)  T(F): 97.9, Max: 99.5 (06-18 @ 14:32)  HR: 101 (86 - 104)  BP: 112/45 (112/45 - 125/58)  BP(mean): --  RR: 18 (16 - 18)  SpO2: 97% (95% - 97%)  general- comfortable   -rs-aeeb,cta  -cvs-s1s2 normal   -p/a- soft,bs+  -extremity- no asymmetrical swelling noted   -cns- non focal         A/P    #Abdominal distension- etiology ascitis -malignant most likely   -paracentesis today, possible omental biopsy tomorrow, if biopsy can not be done tomorrow and we need to wait for cytology report of fluid study then check with  if he can follow up on results of it and arrange for biopsy as an outpt     #d/c today if ok with Dr. Boston with further management as an outpt     #time spent more than 30 minutes
Pt has been seen and examined with FP resident, resident supervised agree with a/p       Patient is a 61y old  Female who presents with a chief complaint of abdominal swelling, physician said to come to er (16 Jun 2017 18:07)        HPI:  60 y/o F w/ no known PMHx was referred to the ED by her GYN for further evaluation of abdominal distention, fatigue, and weight loss (>10lbs.) over the last several months. CT Chest/ABD/Pel in the ED revealed evidence for metastatic disease; large B/L ovarian massess with widespread peritoneal carcinomatosis, massive ascites, and B/L lower lobe nodules.       PHYSICAL EXAM:  Vital Signs Last 24 Hrs  T(C): 36.8, Max: 37.5 (06-17 @ 20:30)  T(F): 98.3, Max: 99.5 (06-17 @ 20:30)  HR: 89 (89 - 109)  BP: 115/54 (115/54 - 132/57)  BP(mean): --  RR: 17 (16 - 17)  SpO2: 94% (94% - 96%)  general- comfortable   -rs-aeeb,cta  -cvs-s1s2 normal   -p/a-large, soft,bs+  -extremity- no asymmetrical swelling noted   -cns- non focal         A/P    #Abdominal distension- etiology ascitis -malignant most likely   -IR guided paracentesis tomorrow and possible biopsy as well    #possible ovarian cancer  -oncology evaluation appreciated   -blood work to follow, GYN evaluation to follow    #dvt pr
Pt has been seen and examined with FP resident, resident supervised agree with a/p       Patient is a 61y old  Female who presents with a chief complaint of abdominal swelling, physician said to come to er (16 Jun 2017 18:07)        HPI:  62 y/o F w/ no known PMHx was referred to the ED by her GYN for further evaluation of abdominal distention, fatigue, and weight loss (>10lbs.) over the last several months. CT Chest/ABD/Pel in the ED revealed evidence for metastatic disease; large B/L ovarian massess with widespread peritoneal carcinomatosis, massive ascites, and B/L lower lobe nodules.       PHYSICAL EXAM:  Vital Signs Last 24 Hrs  T(C): 36.8, Max: 36.9 (06-17 @ 04:39)  T(F): 98.3, Max: 98.5 (06-17 @ 04:39)  HR: 109 (92 - 109)  BP: 132/57 (128/71 - 148/73)  BP(mean): --  RR: 16 (16 - 16)  SpO2: 96% (92% - 100%)  general- comfortable   -rs-aeeb,cta  -cvs-s1s2 normal   -p/a-large, soft,bs+  -extremity- no asymmetrical swelling noted   -cns- non focal         A/P    #Abdominal distension- etiology ascitis -malignant most likely     #possible ovarian cancer  -oncology evaluation appreciated   -blood work to follow, GYN evaluation to follow    #dvt pr
Chief Complaint/Reason for Admission: Abdominal distention and weight loss	  History of Present Illness: 	  60 y/o F w/ no known PMHx was referred to the ED by her GYN for further evaluation of abdominal distention, fatigue, and weight loss (>10lbs.) over the last several months. CT Chest/ABD/Pel in the ED revealed evidence for metastatic disease; large B/L ovarian massess with widespread peritoneal carcinomatosis, massive ascites, and B/L lower lobe nodules. The patient denies any associated  hematochezia, melena, dysuria, fever, chills, or n/v/d.     - Patient seen and examined.  Hemodynamically stable, NAD.  Patient was found to have multiple abdominal, ovarian and lung nodules on CT.  Patient reports that she has had increased abdominal distension over the past 6 months with decreased appetite and a 10 pound weight loss.  Also reports lower extremity swelling "from being on my feet a lot".  Patient has seen Dr. Boyce in the past for GYN.  Currently, she does not have any complaints.  Denies chest pain, SOB.    MEDICATIONS  (STANDING):  heparin  Injectable 5000Unit(s) SubCutaneous every 8 hours    MEDICATIONS  (PRN):  acetaminophen   Tablet 650milliGRAM(s) Oral every 6 hours PRN For Temp greater than 38 C (100.4 F)  acetaminophen   Tablet. 650milliGRAM(s) Oral every 6 hours PRN Mild Pain (1 - 3)  ondansetron Injectable 4milliGRAM(s) IV Push every 6 hours PRN Nausea  senna 2Tablet(s) Oral at bedtime PRN Constipation  docusate sodium 100milliGRAM(s) Oral three times a day PRN Constipation    ICU Vital Signs Last 24 Hrs  T(C): 36.8, Max: 36.9 (- @ 04:39)  T(F): 98.3, Max: 98.5 (- @ 04:39)  HR: 109 (92 - 109)  BP: 132/57 (128/71 - 148/73)  BP(mean): --  ABP: --  ABP(mean): --  RR: 16 (16 - 16)  SpO2: 96% (92% - 100%)    GEN: NAD, comfortable, resting in bed  CV: S1S2, RRR, no mumur  RESP: good air movement, CTABL, no rales, rhonchi or wheezing  ABD: +BS, soft, ND, NT, no guarding, no rigidity  EXT: +2 radial and pedial pulses, +3 pitting edema in bilateral lower extremities, no calve tenderness                          10.3   6.6   )-----------( 361      ( 2017 06:07 )             31.7     2017 06:07    137    |  103    |  7      ----------------------------<  91     4.2     |  27     |  0.52     Ca    8.5        2017 06:07  Mg     2.2       2017 06:07    TPro  6.6    /  Alb  2.4    /  TBili  0.6    /  DBili  x      /  AST  23     /  ALT  13     /  AlkPhos  80     2017 06:07    LIVER FUNCTIONS - ( 2017 06:07 )  Alb: 2.4 g/dL / Pro: 6.6 gm/dL / ALK PHOS: 80 U/L / ALT: 13 U/L / AST: 23 U/L / GGT: x           PT/INR - ( 2017 14:02 )   PT: 13.0 sec;   INR: 1.20 ratio         PTT - ( 2017 14:02 )  PTT:28.9 sec  CAPILLARY BLOOD GLUCOSE    Urinalysis Basic - ( 2017 14:20 )    Color: Lindsey / Appearance: Slightly Turbid / S.020 / pH: x  Gluc: x / Ketone: Trace  / Bili: Negative / Urobili: 1 mg/dL   Blood: x / Protein: 30 mg/dL / Nitrite: Negative   Leuk Esterase: Trace / RBC: 0-2 /HPF / WBC 3-5   Sq Epi: x / Non Sq Epi: Few / Bacteria: Few    Carcinoembryonic Antigen (17 @ 14:27)    Carcinoembryonic Antigen: 1641.0: Test Repeated  METHOD: Roche EIA   The CEA assay should not be used as a cancer screening test. Serum CEA  concentrations should only be used in conjunction with   information available from the clinical evaluation of the patien and  from other [id number]: stic procedures.   CEA Normal Ranges   _________________   Non-smoker: less than 3.9 ng/mL       Smoker: less than 5.5 ng/mL ng/mL    Cancer Antigen, 125 (17 @ 06:07)    Cancer Antigen, 125: 1934: Method: Abbott CMIA  Values obtained with different assay methods or kits cannot be used  interchangeably. Results cannot be interpreted as absolute evidence of the  presence or absence of malignant disease. Effective 6/08/10 the   method was [id number]: verted from Abbott Axsym MEIA to Abbott  CMIA.  Results for these methods may not match exactly. Contact the laboratory if  you have any questions or concerns. U/mL    EXAM:  CT ABDOMEN AND PELVIS IC                        PROCEDURE DATE:  2017    IMPRESSION: Large complex bilateral ovarian masses suspicious for   malignant neoplasms.  Widespread peritoneal carcinomatosis and massive ascites.  Nodules at the lung bases. Recommend chest CT.    EXAM:  CT CHEST                        PROCEDURE DATE:  2017    IMPRESSION:       Bilateral lower lobe nodules as described above concerning for metastatic   disease given findings in the abdomen and pelvis. Primary malignancy in   the left lower lobe cannot entirely be excluded however. Less likely.
Chief Complaint/Reason for Admission: Abdominal distention and weight loss	  History of Present Illness: 	  62 y/o F w/ no known PMHx was referred to the ED by her GYN for further evaluation of abdominal distention, fatigue, and weight loss (>10lbs.) over the last several months. CT Chest/ABD/Pel in the ED revealed evidence for metastatic disease; large B/L ovarian massess with widespread peritoneal carcinomatosis, massive ascites, and B/L lower lobe nodules. The patient denies any associated  hematochezia, melena, dysuria, fever, chills, or n/v/d.     - Patient seen and examined.  Hemodynamically stable, NAD.  Patient was found to have multiple abdominal, ovarian and lung nodules on CT.  Patient reports that she has had increased abdominal distension over the past 6 months with decreased appetite and a 10 pound weight loss.  Also reports lower extremity swelling "from being on my feet a lot".  Patient has seen Dr. Boyce in the past for GYN.  Currently, she does not have any complaints.  Denies chest pain, SOB.    17: Pt seen and examined at bedside. No complaints verbalized. Pt made aware of radiographic findings and plan moving forward at bedside. No sob, chest pain or abd pain at present    MEDICATIONS  (STANDING):  heparin  Injectable 5000Unit(s) SubCutaneous every 8 hours    MEDICATIONS  (PRN):  acetaminophen   Tablet 650milliGRAM(s) Oral every 6 hours PRN For Temp greater than 38 C (100.4 F)  acetaminophen   Tablet. 650milliGRAM(s) Oral every 6 hours PRN Mild Pain (1 - 3)  ondansetron Injectable 4milliGRAM(s) IV Push every 6 hours PRN Nausea  senna 2Tablet(s) Oral at bedtime PRN Constipation  docusate sodium 100milliGRAM(s) Oral three times a day PRN Constipation    Vital Signs Last 24 Hrs  T(C): 36.8, Max: 37.5 ( @ 20:30)  T(F): 98.3, Max: 99.5 ( @ 20:30)  HR: 89 (89 - 109)  BP: 115/54 (115/54 - 132/57)  BP(mean): --  RR: 17 (16 - 17)  SpO2: 94% (94% - 96%)    GEN: NAD, comfortable, resting in bed  CV: S1S2, RRR, no mumur  RESP: good air movement, CTABL, no rales, rhonchi or wheezing  ABD: +BS, soft, ND, NT, no guarding, no rigidity  EXT: +2 radial and pedial pulses, +3 pitting edema in bilateral lower extremities, no calve tenderness    Lab Results:  CBC  CBC Full  -  ( 2017 06:07 )  WBC Count : 6.6 K/uL  Hemoglobin : 10.3 g/dL  Hematocrit : 31.7 %  Platelet Count - Automated : 361 K/uL  Mean Cell Volume : 83.8 fl  Mean Cell Hemoglobin : 27.3 pg  Mean Cell Hemoglobin Concentration : 32.6 gm/dL  Auto Neutrophil # : 5.6 K/uL  Auto Lymphocyte # : 0.5 K/uL  Auto Monocyte # : 0.3 K/uL  Auto Eosinophil # : 0.1 K/uL  Auto Basophil # : 0.1 K/uL  Auto Neutrophil % : 84.9 %  Auto Lymphocyte % : 8.3 %  Auto Monocyte % : 4.0 %  Auto Eosinophil % : 2.1 %  Auto Basophil % : 0.8 %    .		Differential:	[] Automated		[] Manual  Chemistry      137  |  103  |  7   ----------------------------<  91  4.2   |  27  |  0.52    Ca    8.5      2017 06:07  Mg     2.2         TPro  6.6  /  Alb  2.4<L>  /  TBili  0.6  /  DBili  x   /  AST  23  /  ALT  13  /  AlkPhos  80      LIVER FUNCTIONS - ( 2017 06:07 )  Alb: 2.4 g/dL / Pro: 6.6 gm/dL / ALK PHOS: 80 U/L / ALT: 13 U/L / AST: 23 U/L / GGT: x           PT/INR - ( 2017 14:02 )   PT: 13.0 sec;   INR: 1.20 ratio         PTT - ( 2017 14:02 )  PTT:28.9 sec  Urinalysis Basic - ( 2017 14:20 )    Color: Lindsey / Appearance: Slightly Turbid / S.020 / pH: x  Gluc: x / Ketone: Trace  / Bili: Negative / Urobili: 1 mg/dL   Blood: x / Protein: 30 mg/dL / Nitrite: Negative   Leuk Esterase: Trace / RBC: 0-2 /HPF / WBC 3-5   Sq Epi: x / Non Sq Epi: Few / Bacteria: Few        MICROBIOLOGY/CULTURES:    RADIOLOGY RESULTS:    Toxicities (with grade)  1.  2.  3.  4.         Carcinoembryonic Antigen (17 @ 14:27)    Carcinoembryonic Antigen: 1641.0: Test Repeated  METHOD: Roche EIA   The CEA assay should not be used as a cancer screening test. Serum CEA  concentrations should only be used in conjunction with   information available from the clinical evaluation of the patien and  from other [id number]: stic procedures.   CEA Normal Ranges   _________________   Non-smoker: less than 3.9 ng/mL       Smoker: less than 5.5 ng/mL ng/mL    Cancer Antigen, 125 (17 @ 06:07)    Cancer Antigen, 125: 1934: Method: Abbott CMIA  Values obtained with different assay methods or kits cannot be used  interchangeably. Results cannot be interpreted as absolute evidence of the  presence or absence of malignant disease. Effective 6/08/10 the   method was [id number]: verted from Abbott Axsym MEIA to Abbott  CMIA.  Results for these methods may not match exactly. Contact the laboratory if  you have any questions or concerns. U/mL    EXAM:  CT ABDOMEN AND PELVIS IC                        PROCEDURE DATE:  2017    IMPRESSION: Large complex bilateral ovarian masses suspicious for   malignant neoplasms.  Widespread peritoneal carcinomatosis and massive ascites.  Nodules at the lung bases. Recommend chest CT.    EXAM:  CT CHEST                        PROCEDURE DATE:  2017    IMPRESSION:       Bilateral lower lobe nodules as described above concerning for metastatic   disease given findings in the abdomen and pelvis. Primary malignancy in   the left lower lobe cannot entirely be excluded however. Less likely.
Chief Complaint/Reason for Admission: Abdominal distention and weight loss	  History of Present Illness: 	  62 y/o F w/ no known PMHx was referred to the ED by her GYN for further evaluation of abdominal distention, fatigue, and weight loss (>10lbs.) over the last several months. CT Chest/ABD/Pel in the ED revealed evidence for metastatic disease; large B/L ovarian massess with widespread peritoneal carcinomatosis, massive ascites, and B/L lower lobe nodules. The patient denies any associated  hematochezia, melena, dysuria, fever, chills, or n/v/d.    6/17 - Patient seen and examined.  Hemodynamically stable, NAD.  Patient was found to have multiple abdominal, ovarian and lung nodules on CT.  Patient reports that she has had increased abdominal distension over the past 6 months with decreased appetite and a 10 pound weight loss.  Also reports lower extremity swelling "from being on my feet a lot".  Patient has seen Dr. Boyce in the past for GYN.  Currently, she does not have any complaints.  Denies chest pain, SOB.    6/19 - Patient seen and examined with family at bedside.  Patient had paracentesis this morning with 6L of fluid being removed and sample sent for cytology. Discussed case with Dr. Echeverria who has discussed the case with Dr. Read.  Patient is to be discharged home with follow up with Dr. Read in his office on Wednesday for follow up.  Patient may need Omental biopsy if cytology is inconclusive.  Dr. Davis will review the case tomorrow to see if there is a possibility for Omental biopsy.  Patient understand the treatment plan and is in agreement.   Patient is feeling well after procedure, ambulating with no problems.  Denies dizziness when ambulating.  Would like to go home. Hemodynamically stable. No overnight events. Afebrile.    MEDICATIONS  (STANDING):  heparin  Injectable 5000Unit(s) SubCutaneous every 8 hours    MEDICATIONS  (PRN):  acetaminophen   Tablet 650milliGRAM(s) Oral every 6 hours PRN For Temp greater than 38 C (100.4 F)  acetaminophen   Tablet. 650milliGRAM(s) Oral every 6 hours PRN Mild Pain (1 - 3)  ondansetron Injectable 4milliGRAM(s) IV Push every 6 hours PRN Nausea  senna 2Tablet(s) Oral at bedtime PRN Constipation  docusate sodium 100milliGRAM(s) Oral three times a day PRN Constipation    ICU Vital Signs Last 24 Hrs  T(C): 36.6, Max: 37.3 (06-18 @ 21:05)  T(F): 97.9, Max: 99.1 (06-18 @ 21:05)  HR: 101 (86 - 101)  BP: 112/45 (112/45 - 117/59)  BP(mean): --  ABP: --  ABP(mean): --  RR: 18 (16 - 18)  SpO2: 97% (95% - 97%)      GEN: NAD, comfortable, resting in bed  CV: S1S2, RRR, no mumur  RESP: good air movement, CTABL, no rales, rhonchi or wheezing  ABD: +BS, soft, ND, NT, no guarding, no rigidity  EXT: +2 radial and pedial pulses, +3 pitting edema in bilateral lower extremities, no calve tenderness                          10.0   7.0   )-----------( 362      ( 19 Jun 2017 06:01 )             29.4     19 Jun 2017 06:01    136    |  103    |  15     ----------------------------<  86     3.8     |  28     |  0.54     Ca    8.5        19 Jun 2017 06:01        PT/INR - ( 19 Jun 2017 06:01 )   PT: 12.1 sec;   INR: 1.12 ratio         PTT - ( 19 Jun 2017 06:01 )  PTT:27.6 sec  CAPILLARY BLOOD GLUCOSE        Carcinoembryonic Antigen (06.16.17 @ 14:27)    Carcinoembryonic Antigen: 1641.0: Test Repeated  METHOD: Roche EIA   The CEA assay should not be used as a cancer screening test. Serum CEA  concentrations should only be used in conjunction with   information available from the clinical evaluation of the patien and  from other [id number]: stic procedures.   CEA Normal Ranges   _________________   Non-smoker: less than 3.9 ng/mL       Smoker: less than 5.5 ng/mL ng/mL    Cancer Antigen, 125 (06.17.17 @ 06:07)    Cancer Antigen, 125: 1934: Method: Abbott CMIA  Values obtained with different assay methods or kits cannot be used  interchangeably. Results cannot be interpreted as absolute evidence of the  presence or absence of malignant disease. Effective 6/08/10 the   method was [id number]: verted from Abbott Axsym MEIA to Abbott  CMIA.  Results for these methods may not match exactly. Contact the laboratory if  you have any questions or concerns. U/mL    EXAM:  CT ABDOMEN AND PELVIS IC                        PROCEDURE DATE:  06/16/2017    IMPRESSION: Large complex bilateral ovarian masses suspicious for   malignant neoplasms.  Widespread peritoneal carcinomatosis and massive ascites.  Nodules at the lung bases. Recommend chest CT.    EXAM:  CT CHEST                        PROCEDURE DATE:  06/16/2017    IMPRESSION:       Bilateral lower lobe nodules as described above concerning for metastatic   disease given findings in the abdomen and pelvis. Primary malignancy in   the left lower lobe cannot entirely be excluded however. Less likely.    EXAM:  IR PROCEDURE NON PICC                        PROCEDURE DATE:  06/19/2017    Ultrasound-guided paracentesis:  Findings:    Successful ultrasound-guided large volume paracentesis. Approximately   6000 cc clear yellow fluid drained. Fluid sent for lab analysis.    Impression:  Ultrasound-guided  6 L ascites fluid drained.  Fluid sent for lab analysis.  No contrast utilized for the procedure.
HPI:  60 y/o F w/ no known PMHx was referred to the ED by her GYN for further evaluation of abdominal distention, fatigue, and weight loss (>10lbs.) over the last several months. She was seen by Dr Boyce for bloating; GYN exam was normal although adnexa not palpable secondary to ascites. Given the degree of ascites she was advised admission for [facility] / management . CT Chest/ABD/Pel in the ED revealed evidence for metastatic disease; large B/L ovarian massess with widespread peritoneal carcinomatosis, massive ascites, and B/L lower lobe nodules. The patient denies dysphagia, reflux , change in bowel habits, blood in stools , spotting;  No hematochezia, melena, dysuria, fever, chills, or n/v/d. (2017 17:35); labs reveal normochromic and normocytic anemia, elevated CEA and      uncomfortable with abdominal distension.      PAST MEDICAL & SURGICAL HISTORY:  History of tonsillectomy      MEDICATIONS  (STANDING):  heparin  Injectable 5000Unit(s) SubCutaneous every 8 hours          Allergies    No Known Allergies    Intolerances        SOCIAL HISTORY:    FAMILY HISTORY:  Family history of esophageal cancer (Mother)  Family history of Parkinson disease (Father)  Family history of prostate cancer (Father)  Family history of hypertension (Father)  No pertinent family history in first degree relatives      REVIEW OF SYSTEMS      General: Good appetite,10 lb  weight loss;  active;  able to care for self    Skin: No rash, no pruritis, no hives  	  Ophthalmologic: No visual blurring, no diplopia  	  HEENT: No neck pains, sore throat, hearing difficulty, tinnitus    Respiratory and Thorax:  No dyspnea, cough, sputum production, hoarseness, hemoptysis. No pleurisy, chest pains  	  Cardiovascular: No palpitations, chest pains, dyspnea on exertion; no PND, orthopnea    Gastrointestinal:  bloating. No reflux symptoms, dysphagia; No change in bowel habits,   diarrhea,constipation;No blood in stools    Genitourinary: No dysuria, frequency, hematuria. No flank pains    Musculoskeletal: No musculoskeletal pains, no joint pains or swelling    Neurological: No headaches, diplopia, dysarthria, dysphagia, weakness, parasthesia	    Psychiatric: No depression, anxiety	    Hematology/Lymphatics: No swollen glands, masses, edema	    Endocrine:	No hot flashes    Allergic/Immunologic:	Negative    GYN no spoting    Vital Signs Last 24 Hrs  T(C): 36.6, Max: 37.5 (-18 @ 14:32)  T(F): 97.9, Max: 99.5 (-18 @ 14:32)  HR: 86 (86 - 104)  BP: 116/65 (116/65 - 125/58)  BP(mean): --  RR: 16 (16 - 17)  SpO2: 96% (95% - 96%)  Vital Signs Last 24 Hrs  T(C): 36.9, Max: 36.9 (-17 @ 04:39)  T(F): 98.5, Max: 98.5 (-17 @ 04:39)  HR: 92 (92 - 117)  BP: 128/71 (128/71 - 148/73)  BP(mean): --  RR: 16 (16 - 18)  SpO2: 92% (92% - 100%)    PHYSICAL EXAM:      Constitutional: Appears Uncomfortable, in  NAD, A/O X 3   Cachectic    Eyes: EOMI, PERRLA ;No icterus    ENMT:  No oral lesions, thrush; pharynx not injected    Neck:  Supple; No masses, lymph nodes, no bruits    Breasts: Neg masses    Back: No tenderness     Respiratory:  Clear to A/P, No wheezes, rhonchi, rales. No dullness to percussion    Cardiovascular: Normal rate and rhythm; normal S1 and S2; No murmurs. No gallop    Gastrointestinal: marked  distension, tense ascites/ fluid wave, umbilical protrusion  decreased  bowl sounds; No tenderness , guarding, rebound;  no masses, no hepatosplenomegaly    Genitourinary: No flank pains, no inguinal adenopathy    Rectal: NA    Extremities:  No phlebitis, no edema    Vascular: No acrocyanosis, no edema    Neurological: Alert and oriented. Cranial nerves II-XII WNL, Upper extremity / lower extremity  strength WNL;  Reflexes WNL, Plantar downgoing ; No cerebellar signs    Skin: No rash, petechiae purpura, ecchymoses    Lymph Nodes: No cervical, supraclavicular, axillary, inguinal adenopathy    Musculoskeletal: No joint swelling    Psychiatric: Alert, oriented, normal affect                            10.0   7.0   )-----------( 362      ( 2017 06:01 )             29.4     06-19    136  |  103  |  15  ----------------------------<  86  3.8   |  28  |  0.54    Ca    8.5      2017 06:01    CEA 1389   1934    PT/INR - ( 2017 06:01 )   PT: 12.1 sec;   INR: 1.12 ratio       PTT - ( 2017 06:01 )  PTT:27.6 sec      Color: Lindsey / Appearance: Slightly Turbid / S.020 / pH: x  Gluc: x / Ketone: Trace  / Bili: Negative / Urobili: 1 mg/dL   Blood: x / Protein: 30 mg/dL / Nitrite: Negative   Leuk Esterase: Trace / RBC: 0-2 /HPF / WBC 3-5   Sq Epi: x / Non Sq Epi: Few / Bacteria: Few        RADIOLOGY & ADDITIONAL STUDIES:    EXAM:  CT ABDOMEN AND PELVIS IC                            PROCEDURE DATE:  2017        INTERPRETATION:  Clinical information: Ascites and weight loss. Rule out   ovarian cancer.    COMPARISON: None    PROCEDURE:   CT of the Abdomen and Pelvis was performed with intravenous contrast.   Intravenous contrast: 90 ml Omnipaque 350. 10 ml discarded.  Oral contrast: positive contrast was administered.  Sagittal and coronal reformats were performed.    FINDINGS:    LOWER CHEST: 12 mm left lower lobe pulmonary nodule. 4 mm right lower   lobe pulmonary nodule.    LIVER: Within normal limits.  SPLEEN: Within normal limits.  PANCREAS: Within normal limits.  GALLBLADDER: Within normal limits.  BILE DUCTS: Normal caliber.  ADRENALS: Within normal limits.  KIDNEYS/URETERS: 13 mm hypodense right renal lesion, too small for   accurate characterization.    RETROPERITONEUM: No lymphadenopathy.    VESSELS:  Within normal limits.    BOWEL: No bowel obstruction. Appendix normal.  PERITONEUM: Massive ascites. Soft tissue infiltration of the omentum and   numerous nodules along the lining of the peritoneum.    REPRODUCTIVE ORGANS: Bilateral complex cystic/solid adnexal masses,   measuring 11.2 cm on the right and 6.6 cm on the left. Unremarkable   uterus.  BLADDER: Within normal limits.    ABDOMINAL WALL: Within normal limits.  BONES: No acute bony abnormality.    IMPRESSION: Large complex bilateral ovarian masses suspicious for   malignant neoplasms.  Widespread peritoneal carcinomatosis and massive ascites.  Nodules at the lung bases. Recommend chest CT.    Findings discussed with Dr. Miramontes on 2017.    INTERPRETATION:  Exam Date: 2017 4:45 PM  Clinical Information: Nodule seen in the lung bases on CT abdomen same   day. Metastatic ovarian cancer seen on CT same day  Technique:       CT of the chest was performed with axial images obtained   from the thoracic to the inlet to the bilateral adrenal glands       without IV contrast. Maximum intensity projection images were obtained.  Comparison:  ct abdomen same day    FINDINGS:    Lungs, Airways and Pleura: Central tracheobronchial tree is grossly   patent. No endobronchial lesions. Biapical pleural parenchymal scarring.   Right lower lobe platelike atelectasis and/or scarring. Left lower lobe   nodular opacity measuring 1.4 cm. Anterior right lower lobe nodule   measuring 0.6 cm, series 2 image 60. Biapical pleural parenchymal   scarring.    Heart and Great Vessels: Great vessels are unremarkable. Heart is normal   in size. No pericardial effusions.    Mediastinum and Evie: within normal limits    Neck and Chest Wall: within normal limits    Bones: Mild degenerative changes.    Upper Abdomen:  Partially visualized large ascites as seen on CT same day.    IMPRESSION:         Bilateral lower lobe nodules as described above concerning for metastatic   disease given findings in the abdomen and pelvis. Primary malignancy in   the left lower lobe cannot entirely be excluded however. Less likely.
HPI: Pt seen and examined this afternoon for follow up of sx and GOC. Pt notes returning from IR tap of ascites with 6L drained, feeling lighter and endorsing hunger. She denies pain or discomfort. Understands plan for possible IR biopsy tomorrow and then hopefully home.       PAIN: denies    DYSPNEA: denies      ROS:  All other systems reviewed and negative      PHYSICAL EXAM:    Vital Signs Last 24 Hrs  T(C): 36.6, Max: 37.3 (06-18 @ 21:05)  T(F): 97.9, Max: 99.1 (06-18 @ 21:05)  HR: 101 (86 - 101)  BP: 112/45 (112/45 - 117/59)  BP(mean): --  RR: 18 (16 - 18)  SpO2: 97% (95% - 97%)  Daily     Daily     PPSV2: 50-60  %    General: Middle aged woman, thin cachectic, temporal wasting, pleasant, NAD, sitting up in chair  Mental Status: AOx3  HEENT: mmm, perrl, eomi, ncat,   Lungs: dec at bases bl  Cardiac: +s1 s2 rrr  GI: less distended, no tenderness, +bs  : voiding  Ext: + pitting edema bl LE, thin with muslce wasting throughout otherwise  Neuro: no focal deficits    LABS:                        10.0   7.0   )-----------( 362      ( 19 Jun 2017 06:01 )             29.4     06-19    136  |  103  |  15  ----------------------------<  86  3.8   |  28  |  0.54    Ca    8.5      19 Jun 2017 06:01      PT/INR - ( 19 Jun 2017 06:01 )   PT: 12.1 sec;   INR: 1.12 ratio         PTT - ( 19 Jun 2017 06:01 )  PTT:27.6 sec  Albumin: Albumin, Serum: 2.4 g/dL (06-17 @ 06:07)      Allergies    No Known Allergies    Intolerances      MEDICATIONS  (STANDING):  heparin  Injectable 5000Unit(s) SubCutaneous every 8 hours    MEDICATIONS  (PRN):  acetaminophen   Tablet 650milliGRAM(s) Oral every 6 hours PRN For Temp greater than 38 C (100.4 F)  acetaminophen   Tablet. 650milliGRAM(s) Oral every 6 hours PRN Mild Pain (1 - 3)  ondansetron Injectable 4milliGRAM(s) IV Push every 6 hours PRN Nausea  senna 2Tablet(s) Oral at bedtime PRN Constipation  docusate sodium 100milliGRAM(s) Oral three times a day PRN Constipation      RADIOLOGY:

## 2017-06-19 NOTE — DISCHARGE NOTE ADULT - CARE PROVIDERS DIRECT ADDRESSES
,DirectAddress_Unknown,[email or web address][email or web address],DirectAddress_Unknown

## 2017-06-19 NOTE — PROGRESS NOTE ADULT - ASSESSMENT
60 yo F w/ no PMHx admitted 6/16 from Gyn office for increasing abdominal distention, fatigue, and >10lb weight loss in the last 6 months. Pt found to have CT chest/abd/pelvis showing bl ovarian masses w/ widespread carcinomatosis, massive ascites, and bl lower lobe nodules elevated CEA and . Palliative care consulted to establish care and discuss GOC.    1) Ascites  - likely 2nd to underlying malignancy  - s/p IR tap, 6L out  - no discomfort currently  - if needed can consider Tylenol then low dose morphine 5mg po q4h prn    2) Malnutrition  - muscle wasting and evidence of inability to maintain adequate caloric intake  - likely 2/2 to ascites and early satiety  - albumin 2.4  - PPS still good despite this    3) Likely Malignancy  - oncology notes appreciated  - CT C/A/P showing ovarian masses, and mets  - CEA and  elevated, but tissue needed for definitive diagnosis  - pending potential IR omental biopsy tomorrow    4) Prognosis  - guarded  - need to have etiology of cancer and treatment options before elucidating this    5) GOC/Advanced Directives  - pt has capacity  - no official proxy, but will assing brother Gurpreet Boykin [phone] with , he is surrogate currently  - full code  - full discussion about GOC 6/18: workup for cancer type and treatment plan, home with pall for sx monitoring while pursuing curative treatment, continue to provide support in the meantime. Pt has positive outlook and coping well thus far.      Thank you for including us in Ms. Boykin's care. Will continue to follow with you.    Shoshana Godinez MD  Palliative Care Attending
60 yo female admitted with abdominal distension, ascites, scans revealing bilateral ovarian masses, omental metastases, ascites, pulmonary nodules; has anemia, elevated CEA;

## 2017-06-19 NOTE — PROGRESS NOTE ADULT - PROBLEM SELECTOR PLAN 1
- CT abd/chest: Multiple masses in the Lungs, Ovaries, Abdomen most likely malignant in nature.    - Primary source unknown at this time  - : 1932  - CEA: 1641  - Oncology consult appreciated  - Patient will need paracentesis, cell block analysis, omental biopsy by IR  - possible biopsy tomorrow with IR. Will make NPO
- CT abd/chest: Multiple masses in the Lungs, Ovaries, Abdomen most likely malignant in nature.    - Primary source unknown at this time  - : 1932  - CEA: 1641  - Oncology consult appreciated  - S/p Paracentesis with 6L removal and cytology  - Patient to follow up with Dr. Read outpatient on wednesday   - May need omental biopsy with Dr. Davis if cytology inconclusive.
- CT abd/chest: Multiple masses in the Lungs, Ovaries, Abdomen most likely malignant in nature.    - Primary source unknown at this time  - : 1932  - CEA: 1641  - Oncology consult appreciated  - Patient will need paracentesis, cell block analysis, omental biopsy by IR
massive ascites, carcinomatosis, ovarian masses and elevated CEA ,   most likely ovarian cancer  Plan;  diagnostic and therapeutic paracentesis  I spoke to IR and the procedure is to be done today along with omental biopsy if possible.    treatment depending on path  shall likely need chemo first given pulmonary nodules and performance status

## 2017-06-19 NOTE — DISCHARGE NOTE ADULT - MEDICATION SUMMARY - MEDICATIONS TO TAKE
I will START or STAY ON the medications listed below when I get home from the hospital:    milk thistle oral capsule  --  by mouth   -- Indication: For Home med    CoQ10 300 mg oral capsule  -- 1 cap(s) by mouth once a day  -- Indication: For Home med    Acidophilus oral capsule  -- 1 cap(s) by mouth once a day  -- Indication: For Home med    multivitamin  --     -- Indication: For vitamin    cholecalciferol 1000 intl units oral capsule  -- 1 cap(s) by mouth once a day  -- Indication: For vitamin

## 2017-06-20 LAB
NIGHT BLUE STAIN TISS: SIGNIFICANT CHANGE UP
SPECIMEN SOURCE: SIGNIFICANT CHANGE UP

## 2017-06-21 LAB — SPECIMEN SOURCE FLD: SIGNIFICANT CHANGE UP

## 2017-06-23 DIAGNOSIS — C56.2 MALIGNANT NEOPLASM OF LEFT OVARY: ICD-10-CM

## 2017-06-23 DIAGNOSIS — D64.9 ANEMIA, UNSPECIFIED: ICD-10-CM

## 2017-06-23 DIAGNOSIS — C78.6 SECONDARY MALIGNANT NEOPLASM OF RETROPERITONEUM AND PERITONEUM: ICD-10-CM

## 2017-06-23 DIAGNOSIS — R18.0 MALIGNANT ASCITES: ICD-10-CM

## 2017-06-23 DIAGNOSIS — E43 UNSPECIFIED SEVERE PROTEIN-CALORIE MALNUTRITION: ICD-10-CM

## 2017-06-23 DIAGNOSIS — C56.1 MALIGNANT NEOPLASM OF RIGHT OVARY: ICD-10-CM

## 2017-06-24 LAB
CULTURE RESULTS: SIGNIFICANT CHANGE UP
SPECIMEN SOURCE: SIGNIFICANT CHANGE UP

## 2017-06-28 LAB — NON-GYN CYTOLOGY SPEC: SIGNIFICANT CHANGE UP

## 2017-07-19 LAB
CULTURE RESULTS: SIGNIFICANT CHANGE UP
SPECIMEN SOURCE: SIGNIFICANT CHANGE UP

## 2017-07-26 ENCOUNTER — INPATIENT (INPATIENT)
Facility: HOSPITAL | Age: 62
LOS: 1 days | Discharge: HOME | End: 2017-07-28
Attending: SURGERY | Admitting: SURGERY
Payer: COMMERCIAL

## 2017-07-26 VITALS — HEIGHT: 68 IN | WEIGHT: 117.07 LBS

## 2017-07-26 DIAGNOSIS — E41 NUTRITIONAL MARASMUS: ICD-10-CM

## 2017-07-26 DIAGNOSIS — Z90.89 ACQUIRED ABSENCE OF OTHER ORGANS: Chronic | ICD-10-CM

## 2017-07-26 LAB
ALBUMIN SERPL ELPH-MCNC: 2.9 G/DL — LOW (ref 3.3–5)
ALP SERPL-CCNC: 98 U/L — SIGNIFICANT CHANGE UP (ref 40–120)
ALT FLD-CCNC: 17 U/L — SIGNIFICANT CHANGE UP (ref 12–78)
ANION GAP SERPL CALC-SCNC: 9 MMOL/L — SIGNIFICANT CHANGE UP (ref 5–17)
AST SERPL-CCNC: 22 U/L — SIGNIFICANT CHANGE UP (ref 15–37)
BASOPHILS # BLD AUTO: 0 K/UL — SIGNIFICANT CHANGE UP (ref 0–0.2)
BASOPHILS NFR BLD AUTO: 0.4 % — SIGNIFICANT CHANGE UP (ref 0–2)
BILIRUB SERPL-MCNC: 0.5 MG/DL — SIGNIFICANT CHANGE UP (ref 0.2–1.2)
BUN SERPL-MCNC: 16 MG/DL — SIGNIFICANT CHANGE UP (ref 7–23)
CALCIUM SERPL-MCNC: 8.9 MG/DL — SIGNIFICANT CHANGE UP (ref 8.5–10.1)
CHLORIDE SERPL-SCNC: 98 MMOL/L — SIGNIFICANT CHANGE UP (ref 96–108)
CO2 SERPL-SCNC: 29 MMOL/L — SIGNIFICANT CHANGE UP (ref 22–31)
CREAT SERPL-MCNC: 0.7 MG/DL — SIGNIFICANT CHANGE UP (ref 0.5–1.3)
EOSINOPHIL # BLD AUTO: 0 K/UL — SIGNIFICANT CHANGE UP (ref 0–0.5)
EOSINOPHIL NFR BLD AUTO: 0.5 % — SIGNIFICANT CHANGE UP (ref 0–6)
GLUCOSE SERPL-MCNC: 125 MG/DL — HIGH (ref 70–99)
HCT VFR BLD CALC: 31.2 % — LOW (ref 34.5–45)
HGB BLD-MCNC: 10.5 G/DL — LOW (ref 11.5–15.5)
INR BLD: 1.14 RATIO — SIGNIFICANT CHANGE UP (ref 0.88–1.16)
LYMPHOCYTES # BLD AUTO: 0.5 K/UL — LOW (ref 1–3.3)
LYMPHOCYTES # BLD AUTO: 7.4 % — LOW (ref 13–44)
MAGNESIUM SERPL-MCNC: 2 MG/DL — SIGNIFICANT CHANGE UP (ref 1.6–2.6)
MCHC RBC-ENTMCNC: 28.3 PG — SIGNIFICANT CHANGE UP (ref 27–34)
MCHC RBC-ENTMCNC: 33.6 GM/DL — SIGNIFICANT CHANGE UP (ref 32–36)
MCV RBC AUTO: 84.1 FL — SIGNIFICANT CHANGE UP (ref 80–100)
MONOCYTES # BLD AUTO: 0.2 K/UL — SIGNIFICANT CHANGE UP (ref 0–0.9)
MONOCYTES NFR BLD AUTO: 2.3 % — SIGNIFICANT CHANGE UP (ref 2–14)
NEUTROPHILS # BLD AUTO: 6.4 K/UL — SIGNIFICANT CHANGE UP (ref 1.8–7.4)
NEUTROPHILS NFR BLD AUTO: 89.5 % — HIGH (ref 43–77)
PLATELET # BLD AUTO: 395 K/UL — SIGNIFICANT CHANGE UP (ref 150–400)
POTASSIUM SERPL-MCNC: 3.8 MMOL/L — SIGNIFICANT CHANGE UP (ref 3.5–5.3)
POTASSIUM SERPL-SCNC: 3.8 MMOL/L — SIGNIFICANT CHANGE UP (ref 3.5–5.3)
PROT SERPL-MCNC: 7.4 GM/DL — SIGNIFICANT CHANGE UP (ref 6–8.3)
PROTHROM AB SERPL-ACNC: 12.4 SEC — SIGNIFICANT CHANGE UP (ref 9.8–12.7)
RBC # BLD: 3.71 M/UL — LOW (ref 3.8–5.2)
RBC # FLD: 18 % — HIGH (ref 10.3–14.5)
SODIUM SERPL-SCNC: 136 MMOL/L — SIGNIFICANT CHANGE UP (ref 135–145)
WBC # BLD: 7.1 K/UL — SIGNIFICANT CHANGE UP (ref 3.8–10.5)
WBC # FLD AUTO: 7.1 K/UL — SIGNIFICANT CHANGE UP (ref 3.8–10.5)

## 2017-07-26 PROCEDURE — 99285 EMERGENCY DEPT VISIT HI MDM: CPT

## 2017-07-26 PROCEDURE — 74177 CT ABD & PELVIS W/CONTRAST: CPT | Mod: 26

## 2017-07-26 PROCEDURE — 71010: CPT | Mod: 26

## 2017-07-26 RX ORDER — SODIUM CHLORIDE 9 MG/ML
1000 INJECTION INTRAMUSCULAR; INTRAVENOUS; SUBCUTANEOUS
Qty: 0 | Refills: 0 | Status: DISCONTINUED | OUTPATIENT
Start: 2017-07-26 | End: 2017-07-28

## 2017-07-26 RX ORDER — SODIUM CHLORIDE 9 MG/ML
1000 INJECTION INTRAMUSCULAR; INTRAVENOUS; SUBCUTANEOUS ONCE
Qty: 0 | Refills: 0 | Status: COMPLETED | OUTPATIENT
Start: 2017-07-26 | End: 2017-07-26

## 2017-07-26 RX ORDER — ONDANSETRON 8 MG/1
4 TABLET, FILM COATED ORAL EVERY 6 HOURS
Qty: 0 | Refills: 0 | Status: DISCONTINUED | OUTPATIENT
Start: 2017-07-26 | End: 2017-07-28

## 2017-07-26 RX ORDER — MORPHINE SULFATE 50 MG/1
4 CAPSULE, EXTENDED RELEASE ORAL ONCE
Qty: 0 | Refills: 0 | Status: DISCONTINUED | OUTPATIENT
Start: 2017-07-26 | End: 2017-07-26

## 2017-07-26 RX ORDER — HEPARIN SODIUM 5000 [USP'U]/ML
5000 INJECTION INTRAVENOUS; SUBCUTANEOUS EVERY 8 HOURS
Qty: 0 | Refills: 0 | Status: DISCONTINUED | OUTPATIENT
Start: 2017-07-26 | End: 2017-07-28

## 2017-07-26 RX ORDER — ONDANSETRON 8 MG/1
8 TABLET, FILM COATED ORAL ONCE
Qty: 0 | Refills: 0 | Status: COMPLETED | OUTPATIENT
Start: 2017-07-26 | End: 2017-07-26

## 2017-07-26 RX ADMIN — MORPHINE SULFATE 4 MILLIGRAM(S): 50 CAPSULE, EXTENDED RELEASE ORAL at 14:24

## 2017-07-26 RX ADMIN — SODIUM CHLORIDE 1000 MILLILITER(S): 9 INJECTION INTRAMUSCULAR; INTRAVENOUS; SUBCUTANEOUS at 14:24

## 2017-07-26 RX ADMIN — HEPARIN SODIUM 5000 UNIT(S): 5000 INJECTION INTRAVENOUS; SUBCUTANEOUS at 22:38

## 2017-07-26 RX ADMIN — ONDANSETRON 8 MILLIGRAM(S): 8 TABLET, FILM COATED ORAL at 14:24

## 2017-07-26 RX ADMIN — MORPHINE SULFATE 4 MILLIGRAM(S): 50 CAPSULE, EXTENDED RELEASE ORAL at 19:46

## 2017-07-26 NOTE — ED ADULT NURSE NOTE - OBJECTIVE STATEMENT
presents to the ED with nausea/vomiting since yesterday. hx of ovarian CA on chemo. Pt was supposed to have chemo done today at Dr. Boston's office but had an xay in office and sent in for GI evaluation for bowel obstruction.  presents with IVL in left wrist from Dr. Sun office. c/o nausea and abd pain at present. additional IVL placed and labs drawn. medicated as per order. will monitor.

## 2017-07-26 NOTE — ED ADULT NURSE REASSESSMENT NOTE - NS ED NURSE REASSESS COMMENT FT1
Pt A&O x4, received pt from ROLF Galo resting comfortably in bed with rails up, VSS. GRICEL Murry at pt bedside. will monitor.

## 2017-07-26 NOTE — ED ADULT TRIAGE NOTE - CHIEF COMPLAINT QUOTE
sent in from dr. brown's office for vomiting since yesterday at 6pm, pt was suppose to have chemotherapy today, came in with IV lock from doctor's office, pt denies fever. pt has history of ovarian cancera with harjeet (pulmonary nodule), sent in for GI evaluation. c/o abdominal pain.

## 2017-07-26 NOTE — ED PROVIDER NOTE - CARE PLAN
Principal Discharge DX:	SBO (small bowel obstruction)  Secondary Diagnosis:	Umbilical hernia with obstruction  Secondary Diagnosis:	Malignant neoplasm of ovary, unspecified laterality

## 2017-07-26 NOTE — ED PROVIDER NOTE - PROGRESS NOTE DETAILS
Dr. Ochoa:  Signout received from dr. Crabtree:  [] CT A/P.  CT + SBO.  Dr. SHANA Kendall ([facility] surgeon) called: he[place].  Paging Dr. Rivero, covering. Dr. Ochoa:  CAse & CT, labs d/w Dr. Rivero, who's unavailable next few hours.  I am unable to manually reduce umbilical wall hernia.  Paging surgeon on call. Dr. Ochoa:  Dr. Galicia, surgeon on call, aware, surgical PA at bedside.

## 2017-07-26 NOTE — H&P ADULT - HISTORY OF PRESENT ILLNESS
This is a 62y/o female with PMH of Metastatic Ovarian CA only diagnosed 1 month ago who presents to the ED with c/o tenderness around her "belly button" x 2 days.  The pt said that she has had a hernia for years with out any problems.  It has always been red as far as she knows.  Monday night it became tender.  So tender on Tuesday that she couldn't put on her spandex pants.  Tuesday night she began vomiting and this morning went to the doctor and she was sent here.  The pt states that she was diagnosed with ovarian CA 1 month ago when she went to the gynecologist for abdominal swelling without any other symptoms.  The pt started Chemotherapy 4weeks ago with treatment every other week.  Last dose 2 weeks ago today.  today would have been her next dose..  The pt denies fever at home.

## 2017-07-26 NOTE — ED PROVIDER NOTE - OBJECTIVE STATEMENT
60 y/o F with PMHx of ovarian CA on chemo presents to ED c/o persistent vomiting since today morning and abd pain. Pt was supposed to have second round of chemo done today but experienced nausea and vomiting while eating breakfast. Pt notes decreased appetite over the last couple of days. Pt sent in for GI evaluation for bowel obstruction by Dr. Hardin. Pt has no other complaints and denies SOB, CP, fever/chills and JENKINS. GI, Dr. Hardin.

## 2017-07-26 NOTE — ED ADULT NURSE REASSESSMENT NOTE - COMFORT CARE
call bell given
drinking contrast for ct scan. hourly rounding completed/side rails down
repositioned/side rails up/hourly  rounding completed

## 2017-07-26 NOTE — ED PROVIDER NOTE - MEDICAL DECISION MAKING DETAILS
62 y/o F with PMHx of ovarian CA on chemo presents to ED c/o persistent vomiting today morning. Plan fluids, CT scan abd/pelvis with contrast, and UA.

## 2017-07-26 NOTE — ED STATDOCS - PROGRESS NOTE DETAILS
Filippo Barrera on behalf of Attending Abdias. 62 y/o female with PMHx of ovarian CA on chemo c/o persistent vomiting. Pt was supposed to have chemo done today. Pt sent in for GI evaluation. Pt will be sent to the Main ED for further evaluation. Filippo Barrera on behalf of Attending Calero. 60 y/o female with PMHx of ovarian CA on chemo c/o persistent vomiting. Pt was supposed to have chemo done today. Pt sent in for GI evaluation for bowel obstruction. Dr. Osborne, onco. Pt will be sent to the Main ED for further evaluation.

## 2017-07-26 NOTE — PATIENT PROFILE ADULT. - REASON FOR ADMISSION
abdominal swelling, physician said to come to er came with nausea and vomitting due to bowel obstruction

## 2017-07-26 NOTE — H&P ADULT - FAMILY HISTORY
Father  Still living? No  Family history of hypertension, Age at diagnosis: Age Unknown  Family history of prostate cancer, Age at diagnosis: Age Unknown  Family history of Parkinson disease, Age at diagnosis: Age Unknown     Mother  Still living? Unknown  Family history of esophageal cancer, Age at diagnosis: Age Unknown

## 2017-07-26 NOTE — H&P ADULT - NSHPLABSRESULTS_GEN_ALL_CORE
10.5   7.1   )-----------( 395      ( 26 Jul 2017 14:12 )             31.2       07-26    136  |  98  |  16  ----------------------------<  125<H>  3.8   |  29  |  0.70    Ca    8.9      26 Jul 2017 14:12  Mg     2.0     07-26    TPro  7.4  /  Alb  2.9<L>  /  TBili  0.5  /  DBili  x   /  AST  22  /  ALT  17  /  AlkPhos  98  07-26      EXAM:  CT ABDOMEN AND PELVIS OC IC                            PROCEDURE DATE:  07/26/2017      IMPRESSION:  Multiple loops of dilated small bowel measuring up to 3.4 cm, new since   prior exam, suggestive of small bowel obstruction .  The transition point   is within an umbilical wall hernia, that contains a single loop of bowel   and is fluid-filled, new since prior exam.    Large bilateral complex cystic/solid adnexal masses, not changed from   prior exam of 10 days ago, suspicious for malignant neoplasms. Massive   ascites.    0.9 cm nodule of the left lower lobe is again identified, concerning for   metastasis.

## 2017-07-26 NOTE — H&P ADULT - ASSESSMENT
Ass:  Incarcerated Umbilical Hernia with SBO  Plan:  Admit to Dr. Galicia, NPO, NGT to [facility], IVF, Dr. Galicia is aware and will evaluate

## 2017-07-26 NOTE — H&P ADULT - NSHPPHYSICALEXAM_GEN_ALL_CORE
Skin:    HEENT:  PERRLA, no ottorhea, nares patent, buccal mucosa pink and moist    Neck:  non tender to palpation, trachea midline, no JVD    Respiratory: Lungs clear and equal bilat, no r/r/w    Cardiovascular:  S1S2 reg, no M    Gastrointestinal:      Rectal:    Extremities:  FROM X 4, no c/c/e    Vascular:  2+/4+ bilateral    Neurological:  A & O X3

## 2017-07-27 ENCOUNTER — RESULT REVIEW (OUTPATIENT)
Age: 62
End: 2017-07-27

## 2017-07-27 DIAGNOSIS — R18.0 MALIGNANT ASCITES: ICD-10-CM

## 2017-07-27 DIAGNOSIS — K42.0 UMBILICAL HERNIA WITH OBSTRUCTION, WITHOUT GANGRENE: ICD-10-CM

## 2017-07-27 DIAGNOSIS — C56.9 MALIGNANT NEOPLASM OF UNSPECIFIED OVARY: ICD-10-CM

## 2017-07-27 LAB
ALBUMIN FLD-MCNC: 2.5 G/DL — SIGNIFICANT CHANGE UP
AMYLASE FLD-CCNC: 49 U/L — SIGNIFICANT CHANGE UP
ANION GAP SERPL CALC-SCNC: 8 MMOL/L — SIGNIFICANT CHANGE UP (ref 5–17)
ANISOCYTOSIS BLD QL: SLIGHT — SIGNIFICANT CHANGE UP
APPEARANCE UR: (no result)
B PERT IGG+IGM PNL SER: (no result)
BACTERIA # UR AUTO: (no result)
BASOPHILS # BLD AUTO: 0.1 K/UL — SIGNIFICANT CHANGE UP (ref 0–0.2)
BASOPHILS NFR BLD AUTO: 1.8 % — SIGNIFICANT CHANGE UP (ref 0–2)
BILIRUB UR-MCNC: NEGATIVE — SIGNIFICANT CHANGE UP
BLD GP AB SCN SERPL QL: SIGNIFICANT CHANGE UP
BUN SERPL-MCNC: 17 MG/DL — SIGNIFICANT CHANGE UP (ref 7–23)
CALCIUM SERPL-MCNC: 8.2 MG/DL — LOW (ref 8.5–10.1)
CHLORIDE SERPL-SCNC: 102 MMOL/L — SIGNIFICANT CHANGE UP (ref 96–108)
CO2 SERPL-SCNC: 27 MMOL/L — SIGNIFICANT CHANGE UP (ref 22–31)
COD CRY URNS QL: (no result)
COLOR FLD: YELLOW — SIGNIFICANT CHANGE UP
COLOR SPEC: YELLOW — SIGNIFICANT CHANGE UP
COMMENT - URINE: SIGNIFICANT CHANGE UP
CREAT SERPL-MCNC: 0.47 MG/DL — LOW (ref 0.5–1.3)
DIFF PNL FLD: (no result)
EOSINOPHIL # BLD AUTO: 0 K/UL — SIGNIFICANT CHANGE UP (ref 0–0.5)
EOSINOPHIL NFR BLD AUTO: 1.2 % — SIGNIFICANT CHANGE UP (ref 0–6)
EPI CELLS # UR: NEGATIVE — SIGNIFICANT CHANGE UP
FLUID INTAKE SUBSTANCE CLASS: SIGNIFICANT CHANGE UP
FLUID SEGMENTED GRANULOCYTES: 30 % — SIGNIFICANT CHANGE UP
GLUCOSE FLD-MCNC: 102 MG/DL — SIGNIFICANT CHANGE UP
GLUCOSE SERPL-MCNC: 95 MG/DL — SIGNIFICANT CHANGE UP (ref 70–99)
GLUCOSE UR QL: NEGATIVE MG/DL — SIGNIFICANT CHANGE UP
GRAM STN FLD: SIGNIFICANT CHANGE UP
HCT VFR BLD CALC: 27.5 % — LOW (ref 34.5–45)
HGB BLD-MCNC: 9.2 G/DL — LOW (ref 11.5–15.5)
KETONES UR-MCNC: (no result)
LDH SERPL L TO P-CCNC: 253 U/L — SIGNIFICANT CHANGE UP
LEUKOCYTE ESTERASE UR-ACNC: (no result)
LYMPHOCYTES # BLD AUTO: 0.2 K/UL — LOW (ref 1–3.3)
LYMPHOCYTES # BLD AUTO: 7.7 % — LOW (ref 13–44)
LYMPHOCYTES # FLD: 9 % — SIGNIFICANT CHANGE UP
MANUAL DIF COMMENT BLD-IMP: SIGNIFICANT CHANGE UP
MCHC RBC-ENTMCNC: 28.1 PG — SIGNIFICANT CHANGE UP (ref 27–34)
MCHC RBC-ENTMCNC: 33.2 GM/DL — SIGNIFICANT CHANGE UP (ref 32–36)
MCV RBC AUTO: 84.4 FL — SIGNIFICANT CHANGE UP (ref 80–100)
MICROCYTES BLD QL: SLIGHT — SIGNIFICANT CHANGE UP
MONOCYTES # BLD AUTO: 0.2 K/UL — SIGNIFICANT CHANGE UP (ref 0–0.9)
MONOCYTES NFR BLD AUTO: 7.7 % — SIGNIFICANT CHANGE UP (ref 2–14)
MONOS+MACROS # FLD: 61 % — SIGNIFICANT CHANGE UP
NEUTROPHILS # BLD AUTO: 2.6 K/UL — SIGNIFICANT CHANGE UP (ref 1.8–7.4)
NEUTROPHILS NFR BLD AUTO: 81.6 % — HIGH (ref 43–77)
NITRITE UR-MCNC: NEGATIVE — SIGNIFICANT CHANGE UP
PH UR: 5 — SIGNIFICANT CHANGE UP (ref 5–8)
PLAT MORPH BLD: NORMAL — SIGNIFICANT CHANGE UP
PLATELET # BLD AUTO: 330 K/UL — SIGNIFICANT CHANGE UP (ref 150–400)
POTASSIUM SERPL-MCNC: 3.5 MMOL/L — SIGNIFICANT CHANGE UP (ref 3.5–5.3)
POTASSIUM SERPL-SCNC: 3.5 MMOL/L — SIGNIFICANT CHANGE UP (ref 3.5–5.3)
PROT FLD-MCNC: 4.9 G/DL — SIGNIFICANT CHANGE UP
PROT UR-MCNC: 30 MG/DL
RBC # BLD: 3.26 M/UL — LOW (ref 3.8–5.2)
RBC # FLD: 18.4 % — HIGH (ref 10.3–14.5)
RBC BLD AUTO: (no result)
RBC CASTS # UR COMP ASSIST: NEGATIVE /HPF — SIGNIFICANT CHANGE UP (ref 0–4)
RCV VOL RI: 5000 /UL — HIGH (ref 0–5)
SODIUM SERPL-SCNC: 137 MMOL/L — SIGNIFICANT CHANGE UP (ref 135–145)
SP GR SPEC: 1.02 — SIGNIFICANT CHANGE UP (ref 1.01–1.02)
SPECIMEN SOURCE FLD: SIGNIFICANT CHANGE UP
SPECIMEN SOURCE: SIGNIFICANT CHANGE UP
TOTAL NUCLEATED CELL COUNT, BODY FLUID: 265 /UL — HIGH (ref 0–5)
TRIGL FLD-MCNC: 30 MG/DL — SIGNIFICANT CHANGE UP
TUBE TYPE: SIGNIFICANT CHANGE UP
TYPE + AB SCN PNL BLD: SIGNIFICANT CHANGE UP
UROBILINOGEN FLD QL: NEGATIVE MG/DL — SIGNIFICANT CHANGE UP
WBC # BLD: 3.2 K/UL — LOW (ref 3.8–10.5)
WBC # FLD AUTO: 3.2 K/UL — LOW (ref 3.8–10.5)
WBC UR QL: SIGNIFICANT CHANGE UP

## 2017-07-27 PROCEDURE — 49083 ABD PARACENTESIS W/IMAGING: CPT

## 2017-07-27 PROCEDURE — 88305 TISSUE EXAM BY PATHOLOGIST: CPT | Mod: 26

## 2017-07-27 PROCEDURE — 74022 RADEX COMPL AQT ABD SERIES: CPT | Mod: 26

## 2017-07-27 PROCEDURE — 88108 CYTOPATH CONCENTRATE TECH: CPT | Mod: 26

## 2017-07-27 RX ADMIN — HEPARIN SODIUM 5000 UNIT(S): 5000 INJECTION INTRAVENOUS; SUBCUTANEOUS at 13:43

## 2017-07-27 RX ADMIN — SODIUM CHLORIDE 100 MILLILITER(S): 9 INJECTION INTRAMUSCULAR; INTRAVENOUS; SUBCUTANEOUS at 08:22

## 2017-07-27 RX ADMIN — HEPARIN SODIUM 5000 UNIT(S): 5000 INJECTION INTRAVENOUS; SUBCUTANEOUS at 05:29

## 2017-07-27 RX ADMIN — SODIUM CHLORIDE 100 MILLILITER(S): 9 INJECTION INTRAMUSCULAR; INTRAVENOUS; SUBCUTANEOUS at 17:32

## 2017-07-27 RX ADMIN — HEPARIN SODIUM 5000 UNIT(S): 5000 INJECTION INTRAVENOUS; SUBCUTANEOUS at 22:05

## 2017-07-27 NOTE — DIETITIAN INITIAL EVALUATION ADULT. - PHYSICAL APPEARANCE
NFPE reveals severe muscle wasting in scapula, clavicles, shoulders, acromion process, thighs, severe fat depletion in orbital area, triceps, ribs.  Edema in lower extremities./emaciated

## 2017-07-27 NOTE — CONSULT NOTE ADULT - PROBLEM SELECTOR RECOMMENDATION 2
s/b surgery  may need surgical intervention but may have leakage at the site due to massive ascites. not emergent. can be addressed at [facility]/BSO unless recurrent

## 2017-07-27 NOTE — DIETITIAN INITIAL EVALUATION ADULT. - ENERGY NEEDS
Ht.    68  "        Wt.    53    kg               BMI      17.8            IBW  67   kg               Pt is at   79 %  IBW

## 2017-07-27 NOTE — DIETITIAN INITIAL EVALUATION ADULT. - NS AS NUTRI DX NUTRIENT
Pt meets criteria for severe protein-calorie malnutrition in context of chronic disease. Pt has ovarian cancer. NFPE reveals severe muscle wasting in scapula, clavicles, shoulders, acromion process, thighs, severe fat depletion in orbital area, triceps, ribs.  Edema in lower extremities.   Wt has been erratic in past several months./Malnutrition

## 2017-07-27 NOTE — CONSULT NOTE ADULT - SUBJECTIVE AND OBJECTIVE BOX
Patient is a 61y old  Female who presents with a chief complaint of came with nausea and vomitting due to bowel obstruction (26 Jul 2017 23:08)      HPI:  This is a 60y/o female with PMH of Metastatic Ovarian CA only diagnosed 1 month ago who presents to the ED with c/o tenderness around her "belly button" x 2 days.  The pt said that she has had a hernia for years with out any problems.  It has always been red as far as she knows.  Monday night it became tender.  So tender on Tuesday that she couldn't put on her spandex pants.  Tuesday night she began vomiting and this morning went to the doctor and she was sent here.  she was diagnosed with ovarian CA 1 month ago when she went to the gynecologist for abdominal swelling without any other symptoms.  The pt started Chemotherapy with taxol and carboplatin. she has had one dose 3 weeks back and felt better with decrease in her . she was due for her second cycle yesterday but was admitted due to SBO.    she had NGT suction overnight and feels better. NGT is out at this time    PAST MEDICAL & SURGICAL HISTORY:  History of tonsillectomy  ovarian cancer as above.      REVIEW OF SYSTEMS    General:	The patient feels better    Skin: no Rash.	  ENT: no sore throat or oral pain. no difficulty with swallowing  Respiratory: No chest pain, No shortness of breath, no hemoptysis, no cough, no chest pain.  Cardiovascular : No chest pain. no palpitation.  Gastrointestinal:  constipation. abdominal distension and tenderness over the umbilical hernia.  Genitourinary: No hematuria , no dysuria	  Musculoskeletal: No myalgia, no arthralgia, no back pain, no joint swelling  Neurological: no headaches, no dizzyness, +weakness, no double vision. 	  Psychiatric: no change in mood. 	  Hematology/Lymphatics: weakness. 	  Endocrine:	no burning in urine or frequency  Allergic/Immunologic:	 no fever.     Allergies    No Known Allergies    Intolerances            FAMILY HISTORY:  Family history of esophageal cancer (Mother)  Family history of Parkinson disease (Father)  Family history of prostate cancer (Father)  Family history of hypertension (Father)      MEDICATIONS  (STANDING):  heparin  Injectable 5000 Unit(s) SubCutaneous every 8 hours  sodium chloride 0.9%. 1000 milliLiter(s) (100 mL/Hr) IV Continuous <Continuous>    MEDICATIONS  (PRN):  ondansetron Injectable 4 milliGRAM(s) IV Push every 6 hours PRN Nausea      PHYSICAL EXAM:  Vital Signs Last 24 Hrs  T(C): 36.9 (27 Jul 2017 04:13), Max: 37.1 (26 Jul 2017 13:29)  T(F): 98.5 (27 Jul 2017 04:13), Max: 98.7 (26 Jul 2017 13:29)  HR: 90 (27 Jul 2017 04:13) (72 - 92)  BP: 106/61 (27 Jul 2017 04:13) (103/42 - 113/54)  BP(mean): --  RR: 16 (27 Jul 2017 04:13) (16 - 18)  SpO2: 95% (27 Jul 2017 04:13) (95% - 100%)        Gen: Emaciated. comfortable  HEENT: normocephalic/atraumatic, no conjunctival pallor, no scleral icterus, no oral thrush/mucosal bleeding/mucositis  Neck: supple, no masses, no JVD  Breasts: deferred  Back: nontender  Cardiovascular: heart shounds Normal S1S2, no murmurs/rubs/gallops  Respiratory: air entry normal and equal on both sides. no wheeze, no ronchi,   Gastrointestinal: Distended. ascites present. umbilical hernia. BS present.  no evidence for ascites  Genitourinary: deferred  Rectal: deferred  Extremities: no clubbing/cyanosis, no edema, no calf tenderness  Neurological:  Alert oriented X3 cranial nerves normal. no focal deficits  Skin: no rash on visible skin  Lymph Nodes:  no cervical/supraclavicular LAD, no axillary/groin LAD  Musculoskeletal:  full ROM  Psychiatric:  mood appears normal. not obviously anxious or depressed.        LABS:                        9.2    3.2   )-----------( 330      ( 27 Jul 2017 05:44 )             27.5     27 Jul 2017 05:44    137    |  102    |  17     ----------------------------<  95     3.5     |  27     |  0.47     Ca    8.2        27 Jul 2017 05:44  Mg     2.0       26 Jul 2017 14:12    TPro  7.4    /  Alb  2.9    /  TBili  0.5    /  DBili  x      /  AST  22     /  ALT  17     /  AlkPhos  98     26 Jul 2017 14:12    LIVER FUNCTIONS - ( 26 Jul 2017 14:12 )  Alb: 2.9 g/dL / Pro: 7.4 gm/dL / ALK PHOS: 98 U/L / ALT: 17 U/L / AST: 22 U/L / GGT: x           PT/INR - ( 26 Jul 2017 14:12 )   PT: 12.4 sec;   INR: 1.14 ratio             RADIOLOGY & ADDITIONAL STUDIES:    CT abdomen  massive ascites and ovarian mass. SBO with transitional point at the umbilical hernia.

## 2017-07-27 NOTE — CONSULT NOTE ADULT - PROBLEM SELECTOR RECOMMENDATION 9
responded to chemo as evidenced by decrease in  but radiologically unchanged  second chemo dose once SBO resolves

## 2017-07-27 NOTE — PROVIDER CONTACT NOTE (OTHER) - SITUATION
pt returned from paracentesis with low grade temp, temp has not increased by pt is now slightly tachycardic

## 2017-07-27 NOTE — CONSULT NOTE ADULT - PROBLEM SELECTOR RECOMMENDATION 3
massive  may be causing the umbilical hernia to protrude   may benefit from therapeutic paracentesis  VIRGIE Galicia.

## 2017-07-27 NOTE — DIETITIAN INITIAL EVALUATION ADULT. - OTHER INFO
Pt reports recent wt gain of 5 lbs, however pt went from 125 lb to 112 lb from removal of fluids, as per pt.  Pt dx'ed with ovarian cancer 1 month ago, was vomiting for 3 days PTA.  Dx of incarcerated umbilical hernia with SBO.  Pt meets criteria for severe protein-calorie malnutrition in context of chronic disease.

## 2017-07-28 ENCOUNTER — TRANSCRIPTION ENCOUNTER (OUTPATIENT)
Age: 62
End: 2017-07-28

## 2017-07-28 VITALS
TEMPERATURE: 98 F | SYSTOLIC BLOOD PRESSURE: 112 MMHG | OXYGEN SATURATION: 95 % | DIASTOLIC BLOOD PRESSURE: 60 MMHG | RESPIRATION RATE: 16 BRPM | HEART RATE: 82 BPM

## 2017-07-28 DIAGNOSIS — K56.69 OTHER INTESTINAL OBSTRUCTION: ICD-10-CM

## 2017-07-28 RX ORDER — PACLITAXEL 6 MG/ML
280 INJECTION, SOLUTION, CONCENTRATE INTRAVENOUS ONCE
Qty: 0 | Refills: 0 | Status: COMPLETED | OUTPATIENT
Start: 2017-07-28 | End: 2017-07-28

## 2017-07-28 RX ORDER — ONDANSETRON 8 MG/1
16 TABLET, FILM COATED ORAL ONCE
Qty: 0 | Refills: 0 | Status: COMPLETED | OUTPATIENT
Start: 2017-07-28 | End: 2017-07-28

## 2017-07-28 RX ORDER — CARBOPLATIN 50 MG
650 VIAL (EA) INTRAVENOUS ONCE
Qty: 0 | Refills: 0 | Status: COMPLETED | OUTPATIENT
Start: 2017-07-28 | End: 2017-07-28

## 2017-07-28 RX ORDER — ONDANSETRON 8 MG/1
8 TABLET, FILM COATED ORAL EVERY 6 HOURS
Qty: 0 | Refills: 0 | Status: DISCONTINUED | OUTPATIENT
Start: 2017-07-28 | End: 2017-07-28

## 2017-07-28 RX ORDER — DIPHENHYDRAMINE HCL 50 MG
25 CAPSULE ORAL ONCE
Qty: 0 | Refills: 0 | Status: COMPLETED | OUTPATIENT
Start: 2017-07-28 | End: 2017-07-28

## 2017-07-28 RX ORDER — DEXAMETHASONE 0.5 MG/5ML
10 ELIXIR ORAL ONCE
Qty: 0 | Refills: 0 | Status: COMPLETED | OUTPATIENT
Start: 2017-07-28 | End: 2017-07-28

## 2017-07-28 RX ORDER — PANTOPRAZOLE SODIUM 20 MG/1
40 TABLET, DELAYED RELEASE ORAL DAILY
Qty: 0 | Refills: 0 | Status: DISCONTINUED | OUTPATIENT
Start: 2017-07-28 | End: 2017-07-28

## 2017-07-28 RX ADMIN — HEPARIN SODIUM 5000 UNIT(S): 5000 INJECTION INTRAVENOUS; SUBCUTANEOUS at 13:13

## 2017-07-28 RX ADMIN — Medication 315 MILLIGRAM(S): at 18:46

## 2017-07-28 RX ADMIN — PACLITAXEL 98.89 MILLIGRAM(S): 6 INJECTION, SOLUTION, CONCENTRATE INTRAVENOUS at 15:33

## 2017-07-28 RX ADMIN — ONDANSETRON 116 MILLIGRAM(S): 8 TABLET, FILM COATED ORAL at 14:21

## 2017-07-28 RX ADMIN — PANTOPRAZOLE SODIUM 40 MILLIGRAM(S): 20 TABLET, DELAYED RELEASE ORAL at 15:06

## 2017-07-28 RX ADMIN — Medication 105 MILLIGRAM(S): at 14:44

## 2017-07-28 RX ADMIN — SODIUM CHLORIDE 100 MILLILITER(S): 9 INJECTION INTRAMUSCULAR; INTRAVENOUS; SUBCUTANEOUS at 14:22

## 2017-07-28 RX ADMIN — HEPARIN SODIUM 5000 UNIT(S): 5000 INJECTION INTRAVENOUS; SUBCUTANEOUS at 05:22

## 2017-07-28 RX ADMIN — Medication 25 MILLIGRAM(S): at 15:07

## 2017-07-28 NOTE — DISCHARGE NOTE ADULT - REASON FOR ADMISSION
came with nausea and vomitting due to bowel obstruction came with nausea and vomiting due to bowel obstruction

## 2017-07-28 NOTE — DISCHARGE NOTE ADULT - PATIENT PORTAL LINK FT
“You can access the [employer] Patient Portal, offered by [facility], by registering with the following website: [email or web address]”

## 2017-07-28 NOTE — PROGRESS NOTE ADULT - PROBLEM SELECTOR PLAN 1
status post drainage of 5000 mL  may need tunnel catheter if she requires repeated drainage
s/p paracentesis   tolerating regular diet  dc home  outpatient repair of umbilical hernia

## 2017-07-28 NOTE — DISCHARGE NOTE ADULT - HOSPITAL COURSE
sbo caused by umbilical hernia, hernia reduced in the er and the patient did well post reduction.  she had a paracentesis yesterday which took off 5 liters

## 2017-07-28 NOTE — DISCHARGE NOTE ADULT - CARE PLAN
Principal Discharge DX:	SBO (small bowel obstruction)  Goal:	will need outpatient hernia repair  Instructions for follow-up, activity and diet:	if this occurs again proceed to the er

## 2017-07-28 NOTE — DISCHARGE NOTE ADULT - MEDICATION SUMMARY - MEDICATIONS TO TAKE
I will START or STAY ON the medications listed below when I get home from the hospital:    milk thistle oral capsule  --  by mouth   -- Indication: For SMALL BOWEL OBSTRUCTION, UMBILICAL HERNIA, OVARIAN CANCER    CoQ10 300 mg oral capsule  -- 1 cap(s) by mouth once a day  -- Indication: For SMALL BOWEL OBSTRUCTION, UMBILICAL HERNIA, OVARIAN CANCER    Acidophilus oral capsule  -- 1 cap(s) by mouth once a day  -- Indication: For SMALL BOWEL OBSTRUCTION, UMBILICAL HERNIA, OVARIAN CANCER    multivitamin  --     -- Indication: For SMALL BOWEL OBSTRUCTION, UMBILICAL HERNIA, OVARIAN CANCER    cholecalciferol 1000 intl units oral capsule  -- 1 cap(s) by mouth once a day  -- Indication: For SMALL BOWEL OBSTRUCTION, UMBILICAL HERNIA, OVARIAN CANCER

## 2017-07-28 NOTE — PROGRESS NOTE ADULT - SUBJECTIVE AND OBJECTIVE BOX
Patient is a 61y old  Female who presents with a chief complaint of came with nausea and vomitting due to bowel obstruction       HPI:  This is a 62y/o female with PMH of Metastatic Ovarian CA only diagnosed 1 month ago who presents to the ED with c/o tenderness around her "belly button" x 2 days.  The pt said that she has had a hernia for years with out any problems.  It has always been red as far as she knows.  Monday night it became tender.  So tender on Tuesday that she couldn't put on her spandex pants.  Tuesday night she began vomiting and this morning went to the doctor and she was sent here.  she was diagnosed with ovarian CA 1 month ago when she went to the gynecologist for abdominal swelling without any other symptoms.  The pt started Chemotherapy with taxol and carboplatin. she has had one dose 3 weeks back and felt better with decrease in her . she was due for her second cycle yesterday but was admitted due to SBO.    she patient had a large volume  paracentesis done yesterday. 5000 mL of acetic fluid was removed.    Her abdominal distention has improved. she is moving her bowels    Her umbilical hernia is not reducible.        PAST MEDICAL & SURGICAL HISTORY:  History of tonsillectomy  ovarian cancer as above.      REVIEW OF SYSTEMS    General:	The patient feels better    Skin: no Rash.	  ENT: no sore throat or oral pain. no difficulty with swallowing  Respiratory: No chest pain, No shortness of breath, no hemoptysis, no cough, no chest pain.  Cardiovascular : No chest pain. no palpitation.  Gastrointestinal:  constipation. abdominal distension  decreased. no nausea or vomiting  Genitourinary: No hematuria , no dysuria	  Musculoskeletal: No myalgia, no arthralgia, no back pain, no joint swelling  Neurological: no headaches, no dizzyness, +weakness, no double vision. 	  Psychiatric: no change in mood. 	  Hematology/Lymphatics: weakness. 	  Endocrine:	no burning in urine or frequency  Allergic/Immunologic:	 no fever.     Allergies    No Known Allergies    Intolerances            FAMILY HISTORY:  Family history of esophageal cancer (Mother)  Family history of Parkinson disease (Father)  Family history of prostate cancer (Father)  Family history of hypertension (Father)    MEDICATIONS  (STANDING):  heparin  Injectable 5000 Unit(s) SubCutaneous every 8 hours  sodium chloride 0.9%. 1000 milliLiter(s) (100 mL/Hr) IV Continuous <Continuous>  CARBOplatin IVPB (eMAR) 650 milliGRAM(s) IV Intermittent once  ondansetron  IVPB 16 milliGRAM(s) IV Intermittent once  pantoprazole  Injectable 40 milliGRAM(s) IV Push daily      Vital Signs Last 24 Hrs  T(C): 36.9 (28 Jul 2017 12:27), Max: 36.9 (27 Jul 2017 21:01)  T(F): 98.5 (28 Jul 2017 12:27), Max: 98.5 (27 Jul 2017 21:01)  HR: 88 (28 Jul 2017 12:27) (76 - 88)  BP: 100/47 (28 Jul 2017 12:27) (96/43 - 115/51)  BP(mean): --  RR: 16 (28 Jul 2017 12:27) (16 - 17)  SpO2: 95% (28 Jul 2017 12:27) (95% - 98%)        Gen: Emaciated. comfortable  HEENT: normocephalic/atraumatic, no conjunctival pallor, no scleral icterus, no oral thrush/mucosal bleeding/mucositis  Neck: supple, no masses, no JVD  Breasts: deferred  Back: nontender  Cardiovascular: heart shounds Normal S1S2, no murmurs/rubs/gallops  Respiratory: air entry normal and equal on both sides. no wheeze, no ronchi,   Gastrointestinal:less distended ascites present. umbilical hernia no longer evident at this time BS present.  Genitourinary: deferred  Rectal: deferred  Extremities: no clubbing/cyanosis, no edema, no calf tenderness  Neurological:  Alert oriented X3 cranial nerves normal. no focal deficits  Skin: no rash on visible skin  Lymph Nodes:  no cervical/supraclavicular LAD, no axillary/groin LAD  Musculoskeletal:  full ROM  Psychiatric:  mood appears normal. not obviously anxious or depressed.                          9.2    3.2   )-----------( 330      ( 27 Jul 2017 05:44 )             27.5     07-27    137  |  102  |  17  ----------------------------<  95  3.5   |  27  |  0.47<L>    Ca    8.2<L>      27 Jul 2017 05:44      RADIOLOGY & ADDITIONAL STUDIES:    CT abdomen  massive ascites and ovarian mass. SBO with transitional point at the umbilical hernia.
VIR Procedure Note    Dx: Malignant ascites  Procedure: Paracentesis. No complications. ~5L serous fluid, sent to lab. No bleeding    Jorge A Saldivar MD  [facility]
The patient is doing well without complaints.  No pain.    Vital Signs Last 24 Hrs  T(C): 36.8 (28 Jul 2017 04:31), Max: 37.8 (27 Jul 2017 13:37)  T(F): 98.3 (28 Jul 2017 04:31), Max: 100 (27 Jul 2017 13:37)  HR: 76 (28 Jul 2017 04:31) (76 - 107)  BP: 115/51 (28 Jul 2017 04:31) (96/43 - 120/52)  BP(mean): --  RR: 17 (28 Jul 2017 04:31) (16 - 17)  SpO2: 98% (28 Jul 2017 04:31) (96% - 98%)    PHYSICAL EXAM:  General: NAD.  HEENT: no JVD, no jaundice.  LUNGS: CTAB.  Heart: S1 S2 RRR  Abd: soft nt/nd reducible umbilical hernia                          9.2    3.2   )-----------( 330      ( 27 Jul 2017 05:44 )             27.5       07-27    137  |  102  |  17  ----------------------------<  95  3.5   |  27  |  0.47<L>    Ca    8.2<L>      27 Jul 2017 05:44  Mg     2.0     07-26    TPro  7.4  /  Alb  2.9<L>  /  TBili  0.5  /  DBili  x   /  AST  22  /  ALT  17  /  AlkPhos  98  07-26      PT/INR - ( 26 Jul 2017 14:12 )   PT: 12.4 sec;   INR: 1.14 ratio

## 2017-07-28 NOTE — PROGRESS NOTE ADULT - PROBLEM SELECTOR PLAN 2
2nd cycle of carboplatinum and Taxol  to be administered today  carboplatinum AUC of 5  Taxol 175 mg    next cycle will be Due in 3 weeks    date of discharge  wound from oncology standpoint after chemotherapy    please discharge with Zofran po when necessary

## 2017-07-31 LAB — NON-GYN CYTOLOGY SPEC: SIGNIFICANT CHANGE UP

## 2017-08-01 DIAGNOSIS — R11.10 VOMITING, UNSPECIFIED: ICD-10-CM

## 2017-08-01 DIAGNOSIS — C56.9 MALIGNANT NEOPLASM OF UNSPECIFIED OVARY: ICD-10-CM

## 2017-08-01 DIAGNOSIS — K42.0 UMBILICAL HERNIA WITH OBSTRUCTION, WITHOUT GANGRENE: ICD-10-CM

## 2017-08-01 DIAGNOSIS — R18.8 OTHER ASCITES: ICD-10-CM

## 2017-08-01 LAB
CULTURE RESULTS: SIGNIFICANT CHANGE UP
SPECIMEN SOURCE: SIGNIFICANT CHANGE UP

## 2017-08-02 LAB
CULTURE RESULTS: SIGNIFICANT CHANGE UP
SPECIMEN SOURCE: SIGNIFICANT CHANGE UP

## 2017-08-04 ENCOUNTER — INPATIENT (INPATIENT)
Facility: HOSPITAL | Age: 62
LOS: 2 days | Discharge: HOME | End: 2017-08-07
Attending: FAMILY MEDICINE | Admitting: FAMILY MEDICINE
Payer: COMMERCIAL

## 2017-08-04 VITALS — WEIGHT: 104.94 LBS | HEIGHT: 67 IN

## 2017-08-04 DIAGNOSIS — Z90.89 ACQUIRED ABSENCE OF OTHER ORGANS: Chronic | ICD-10-CM

## 2017-08-04 LAB
ALBUMIN SERPL ELPH-MCNC: 3.3 G/DL — SIGNIFICANT CHANGE UP (ref 3.3–5)
ALP SERPL-CCNC: 170 U/L — HIGH (ref 40–120)
ALT FLD-CCNC: 35 U/L — SIGNIFICANT CHANGE UP (ref 12–78)
ANION GAP SERPL CALC-SCNC: 11 MMOL/L — SIGNIFICANT CHANGE UP (ref 5–17)
ANISOCYTOSIS BLD QL: SLIGHT — SIGNIFICANT CHANGE UP
AST SERPL-CCNC: 28 U/L — SIGNIFICANT CHANGE UP (ref 15–37)
BASE EXCESS BLDV CALC-SCNC: -10.9 MMOL/L — LOW (ref -2–2)
BASOPHILS # BLD AUTO: 0 K/UL — SIGNIFICANT CHANGE UP (ref 0–0.2)
BASOPHILS NFR BLD AUTO: 0.9 % — SIGNIFICANT CHANGE UP (ref 0–2)
BILIRUB SERPL-MCNC: 0.6 MG/DL — SIGNIFICANT CHANGE UP (ref 0.2–1.2)
BUN SERPL-MCNC: 21 MG/DL — SIGNIFICANT CHANGE UP (ref 7–23)
CALCIUM SERPL-MCNC: 9.7 MG/DL — SIGNIFICANT CHANGE UP (ref 8.5–10.1)
CHLORIDE SERPL-SCNC: 105 MMOL/L — SIGNIFICANT CHANGE UP (ref 96–108)
CO2 SERPL-SCNC: 18 MMOL/L — LOW (ref 22–31)
CREAT SERPL-MCNC: 0.89 MG/DL — SIGNIFICANT CHANGE UP (ref 0.5–1.3)
DACRYOCYTES BLD QL SMEAR: SLIGHT — SIGNIFICANT CHANGE UP
EOSINOPHIL # BLD AUTO: 0 K/UL — SIGNIFICANT CHANGE UP (ref 0–0.5)
EOSINOPHIL NFR BLD AUTO: 1 % — SIGNIFICANT CHANGE UP (ref 0–6)
GLUCOSE SERPL-MCNC: 140 MG/DL — HIGH (ref 70–99)
HCO3 BLDV-SCNC: 16 MMOL/L — LOW (ref 21–29)
HCT VFR BLD CALC: 34 % — LOW (ref 34.5–45)
HGB BLD-MCNC: 11.5 G/DL — SIGNIFICANT CHANGE UP (ref 11.5–15.5)
HYPERCHROMIA BLD QL AUTO: SIGNIFICANT CHANGE UP
LACTATE SERPL-SCNC: 1.4 MMOL/L — SIGNIFICANT CHANGE UP (ref 0.7–2)
LYMPHOCYTES # BLD AUTO: 0.7 K/UL — LOW (ref 1–3.3)
LYMPHOCYTES # BLD AUTO: 25.3 % — SIGNIFICANT CHANGE UP (ref 13–44)
MACROCYTES BLD QL: SLIGHT — SIGNIFICANT CHANGE UP
MANUAL DIF COMMENT BLD-IMP: SIGNIFICANT CHANGE UP
MCHC RBC-ENTMCNC: 28.7 PG — SIGNIFICANT CHANGE UP (ref 27–34)
MCHC RBC-ENTMCNC: 33.6 GM/DL — SIGNIFICANT CHANGE UP (ref 32–36)
MCV RBC AUTO: 85.4 FL — SIGNIFICANT CHANGE UP (ref 80–100)
MICROCYTES BLD QL: SLIGHT — SIGNIFICANT CHANGE UP
MONOCYTES # BLD AUTO: 0.3 K/UL — SIGNIFICANT CHANGE UP (ref 0–0.9)
MONOCYTES NFR BLD AUTO: 11.1 % — SIGNIFICANT CHANGE UP (ref 2–14)
NEUTROPHILS # BLD AUTO: 1.8 K/UL — SIGNIFICANT CHANGE UP (ref 1.8–7.4)
NEUTROPHILS NFR BLD AUTO: 61.7 % — SIGNIFICANT CHANGE UP (ref 43–77)
OVALOCYTES BLD QL SMEAR: SLIGHT — SIGNIFICANT CHANGE UP
PCO2 BLDV: 42 MMHG — SIGNIFICANT CHANGE UP (ref 35–50)
PH BLDV: 7.21 — LOW (ref 7.35–7.45)
PLAT MORPH BLD: NORMAL — SIGNIFICANT CHANGE UP
PLATELET # BLD AUTO: 388 K/UL — SIGNIFICANT CHANGE UP (ref 150–400)
PO2 BLDV: 73 MMHG — HIGH (ref 25–45)
POIKILOCYTOSIS BLD QL AUTO: SLIGHT — SIGNIFICANT CHANGE UP
POLYCHROMASIA BLD QL SMEAR: SLIGHT — SIGNIFICANT CHANGE UP
POTASSIUM SERPL-MCNC: 2.8 MMOL/L — CRITICAL LOW (ref 3.5–5.3)
POTASSIUM SERPL-SCNC: 2.8 MMOL/L — CRITICAL LOW (ref 3.5–5.3)
PROT SERPL-MCNC: 8 GM/DL — SIGNIFICANT CHANGE UP (ref 6–8.3)
RBC # BLD: 3.99 M/UL — SIGNIFICANT CHANGE UP (ref 3.8–5.2)
RBC # FLD: 17.2 % — HIGH (ref 10.3–14.5)
RBC BLD AUTO: (no result)
SAO2 % BLDV: 90 % — HIGH (ref 67–88)
SODIUM SERPL-SCNC: 134 MMOL/L — LOW (ref 135–145)
WBC # BLD: 3 K/UL — LOW (ref 3.8–10.5)
WBC # FLD AUTO: 3 K/UL — LOW (ref 3.8–10.5)

## 2017-08-04 PROCEDURE — 93010 ELECTROCARDIOGRAM REPORT: CPT

## 2017-08-04 PROCEDURE — 99285 EMERGENCY DEPT VISIT HI MDM: CPT

## 2017-08-04 RX ORDER — POTASSIUM CHLORIDE 20 MEQ
40 PACKET (EA) ORAL ONCE
Qty: 0 | Refills: 0 | Status: COMPLETED | OUTPATIENT
Start: 2017-08-04 | End: 2017-08-04

## 2017-08-04 RX ORDER — POTASSIUM CHLORIDE 20 MEQ
10 PACKET (EA) ORAL ONCE
Qty: 0 | Refills: 0 | Status: COMPLETED | OUTPATIENT
Start: 2017-08-04 | End: 2017-08-04

## 2017-08-04 RX ORDER — SODIUM CHLORIDE 9 MG/ML
3 INJECTION INTRAMUSCULAR; INTRAVENOUS; SUBCUTANEOUS ONCE
Qty: 0 | Refills: 0 | Status: COMPLETED | OUTPATIENT
Start: 2017-08-04 | End: 2017-08-04

## 2017-08-04 RX ORDER — SODIUM CHLORIDE 9 MG/ML
2000 INJECTION INTRAMUSCULAR; INTRAVENOUS; SUBCUTANEOUS ONCE
Qty: 0 | Refills: 0 | Status: COMPLETED | OUTPATIENT
Start: 2017-08-04 | End: 2017-08-04

## 2017-08-04 RX ADMIN — SODIUM CHLORIDE 2000 MILLILITER(S): 9 INJECTION INTRAMUSCULAR; INTRAVENOUS; SUBCUTANEOUS at 20:41

## 2017-08-04 RX ADMIN — Medication 50 MILLIEQUIVALENT(S): at 22:17

## 2017-08-04 RX ADMIN — SODIUM CHLORIDE 3 MILLILITER(S): 9 INJECTION INTRAMUSCULAR; INTRAVENOUS; SUBCUTANEOUS at 20:41

## 2017-08-04 NOTE — ED ADULT NURSE NOTE - OBJECTIVE STATEMENT
pt arrives to ED complaining of nausea and abdominal pain. pt was vomiting on arrival. pt was diagnosed with ovarian cancer in June and has been receiving chemotherapy. pt has been seen outpatient for dehydration and diarrhea twice this week. pt denies any other medical history. vss.

## 2017-08-04 NOTE — ED ADULT NURSE REASSESSMENT NOTE - COMFORT CARE
assisted in changing patients sheets and brief. patient is now clean at this time. family is at bedside. call bell was given.  hourly rounding completed/warm blanket provided/assisted to bathroom

## 2017-08-04 NOTE — ED ADULT TRIAGE NOTE - CHIEF COMPLAINT QUOTE
Patient comes to ED for dehydration and diarrhea. pt is on chemo which just got up' d in dose. last treatment Friday. Pt has ovarian cancer.

## 2017-08-04 NOTE — ED PROVIDER NOTE - MEDICAL DECISION MAKING DETAILS
Pt likely having reaction to chemotherapy per oncology.  Pt to be admitted to medicine for hydration and repletion of potassium.

## 2017-08-04 NOTE — ED PROVIDER NOTE - OBJECTIVE STATEMENT
62 y/o Female with h/o ovarian cancer s/p chemo 1 week PTA p/w persistent watery diarrhea and vomiting over the past week.  Pt was just discharged from [facility] for abdominal pain and umbilical hernia that was reduced.  Pt currently afebrile.

## 2017-08-05 DIAGNOSIS — R19.7 DIARRHEA, UNSPECIFIED: ICD-10-CM

## 2017-08-05 DIAGNOSIS — R11.10 VOMITING, UNSPECIFIED: ICD-10-CM

## 2017-08-05 DIAGNOSIS — E87.6 HYPOKALEMIA: ICD-10-CM

## 2017-08-05 LAB
ALBUMIN SERPL ELPH-MCNC: 2.6 G/DL — LOW (ref 3.3–5)
ALP SERPL-CCNC: 128 U/L — HIGH (ref 40–120)
ALT FLD-CCNC: 25 U/L — SIGNIFICANT CHANGE UP (ref 12–78)
ANION GAP SERPL CALC-SCNC: 10 MMOL/L — SIGNIFICANT CHANGE UP (ref 5–17)
ANION GAP SERPL CALC-SCNC: 9 MMOL/L — SIGNIFICANT CHANGE UP (ref 5–17)
AST SERPL-CCNC: 23 U/L — SIGNIFICANT CHANGE UP (ref 15–37)
BILIRUB SERPL-MCNC: 0.5 MG/DL — SIGNIFICANT CHANGE UP (ref 0.2–1.2)
BUN SERPL-MCNC: 17 MG/DL — SIGNIFICANT CHANGE UP (ref 7–23)
BUN SERPL-MCNC: 19 MG/DL — SIGNIFICANT CHANGE UP (ref 7–23)
CALCIUM SERPL-MCNC: 8.2 MG/DL — LOW (ref 8.5–10.1)
CALCIUM SERPL-MCNC: 8.4 MG/DL — LOW (ref 8.5–10.1)
CHLORIDE SERPL-SCNC: 109 MMOL/L — HIGH (ref 96–108)
CHLORIDE SERPL-SCNC: 110 MMOL/L — HIGH (ref 96–108)
CO2 SERPL-SCNC: 16 MMOL/L — LOW (ref 22–31)
CO2 SERPL-SCNC: 18 MMOL/L — LOW (ref 22–31)
CREAT SERPL-MCNC: 0.58 MG/DL — SIGNIFICANT CHANGE UP (ref 0.5–1.3)
CREAT SERPL-MCNC: 0.64 MG/DL — SIGNIFICANT CHANGE UP (ref 0.5–1.3)
GLUCOSE SERPL-MCNC: 101 MG/DL — HIGH (ref 70–99)
GLUCOSE SERPL-MCNC: 87 MG/DL — SIGNIFICANT CHANGE UP (ref 70–99)
MAGNESIUM SERPL-MCNC: 1.8 MG/DL — SIGNIFICANT CHANGE UP (ref 1.6–2.6)
POTASSIUM SERPL-MCNC: 2.8 MMOL/L — CRITICAL LOW (ref 3.5–5.3)
POTASSIUM SERPL-MCNC: 2.8 MMOL/L — CRITICAL LOW (ref 3.5–5.3)
POTASSIUM SERPL-SCNC: 2.8 MMOL/L — CRITICAL LOW (ref 3.5–5.3)
POTASSIUM SERPL-SCNC: 2.8 MMOL/L — CRITICAL LOW (ref 3.5–5.3)
PROT SERPL-MCNC: 6.6 GM/DL — SIGNIFICANT CHANGE UP (ref 6–8.3)
SODIUM SERPL-SCNC: 135 MMOL/L — SIGNIFICANT CHANGE UP (ref 135–145)
SODIUM SERPL-SCNC: 137 MMOL/L — SIGNIFICANT CHANGE UP (ref 135–145)

## 2017-08-05 RX ORDER — SODIUM CHLORIDE 9 MG/ML
1000 INJECTION INTRAMUSCULAR; INTRAVENOUS; SUBCUTANEOUS
Qty: 0 | Refills: 0 | Status: DISCONTINUED | OUTPATIENT
Start: 2017-08-05 | End: 2017-08-05

## 2017-08-05 RX ORDER — DEXTROSE MONOHYDRATE, SODIUM CHLORIDE, AND POTASSIUM CHLORIDE 50; .745; 4.5 G/1000ML; G/1000ML; G/1000ML
1000 INJECTION, SOLUTION INTRAVENOUS
Qty: 0 | Refills: 0 | Status: DISCONTINUED | OUTPATIENT
Start: 2017-08-05 | End: 2017-08-07

## 2017-08-05 RX ORDER — ENOXAPARIN SODIUM 100 MG/ML
40 INJECTION SUBCUTANEOUS EVERY 24 HOURS
Qty: 0 | Refills: 0 | Status: DISCONTINUED | OUTPATIENT
Start: 2017-08-05 | End: 2017-08-07

## 2017-08-05 RX ORDER — ACETAMINOPHEN 500 MG
650 TABLET ORAL EVERY 6 HOURS
Qty: 0 | Refills: 0 | Status: DISCONTINUED | OUTPATIENT
Start: 2017-08-05 | End: 2017-08-07

## 2017-08-05 RX ORDER — POTASSIUM CHLORIDE 20 MEQ
40 PACKET (EA) ORAL ONCE
Qty: 0 | Refills: 0 | Status: COMPLETED | OUTPATIENT
Start: 2017-08-05 | End: 2017-08-05

## 2017-08-05 RX ORDER — POTASSIUM CHLORIDE 20 MEQ
10 PACKET (EA) ORAL
Qty: 0 | Refills: 0 | Status: COMPLETED | OUTPATIENT
Start: 2017-08-05 | End: 2017-08-05

## 2017-08-05 RX ORDER — ONDANSETRON 8 MG/1
4 TABLET, FILM COATED ORAL EVERY 6 HOURS
Qty: 0 | Refills: 0 | Status: DISCONTINUED | OUTPATIENT
Start: 2017-08-05 | End: 2017-08-07

## 2017-08-05 RX ADMIN — Medication 50 MILLIEQUIVALENT(S): at 00:51

## 2017-08-05 RX ADMIN — Medication 100 MILLIEQUIVALENT(S): at 13:41

## 2017-08-05 RX ADMIN — Medication 100 MILLIEQUIVALENT(S): at 08:53

## 2017-08-05 RX ADMIN — SODIUM CHLORIDE 100 MILLILITER(S): 9 INJECTION INTRAMUSCULAR; INTRAVENOUS; SUBCUTANEOUS at 08:51

## 2017-08-05 RX ADMIN — Medication 40 MILLIEQUIVALENT(S): at 08:53

## 2017-08-05 RX ADMIN — DEXTROSE MONOHYDRATE, SODIUM CHLORIDE, AND POTASSIUM CHLORIDE 100 MILLILITER(S): 50; .745; 4.5 INJECTION, SOLUTION INTRAVENOUS at 16:11

## 2017-08-05 RX ADMIN — ENOXAPARIN SODIUM 40 MILLIGRAM(S): 100 INJECTION SUBCUTANEOUS at 08:52

## 2017-08-05 RX ADMIN — Medication 100 MILLIEQUIVALENT(S): at 11:16

## 2017-08-05 NOTE — ED ADULT NURSE REASSESSMENT NOTE - NS ED NURSE REASSESS COMMENT FT1
pt and family made aware of plan to admit to hospital, pt assisted with bedpan and linens changed, potassium rider continues infusing at slow rate secondary to burning

## 2017-08-05 NOTE — H&P ADULT - NSHPOUTPATIENTPROVIDERS_GEN_ALL_CORE
Hematology/Oncology; Dr. Read Hematology/Oncology; Dr. Read  PCP; Dr. Malia Abdul  GYN; Dr. Miya Boyce

## 2017-08-05 NOTE — DIETITIAN INITIAL EVALUATION ADULT. - NUTRITIONGOAL OUTCOME1
diet advance to clear liquid to full liquid to low fiber, pt will consume and tolerate >75% needs diet advance to regular when feasible,  pt will consume and tolerate >75% needs and supplement

## 2017-08-05 NOTE — DIETITIAN INITIAL EVALUATION ADULT. - NS AS NUTRI INTERV MEALS SNACK
Fiber - modified diet/General/healthful diet Advance diet when feasible to regular/General/healthful diet

## 2017-08-05 NOTE — PROGRESS NOTE ADULT - SUBJECTIVE AND OBJECTIVE BOX
Pt has been seen and examined with FP resident, resident supervised agree with a/p       Patient is a 61y old  Female who presents with a chief complaint of came with nausea and vomitting due to bowel obstruction (05 Aug 2017 06:25)        HPI:  60 y/o F PMHx significant for recently diagnosed Ovarian cancer who was referred to the ED by her Oncologist for further evaluation of dehydration and diarrhea. The patient last treatment with chemotherapy was last Friday. The patient notes that she has had persistent watery diarrhea associated w/ intractable nausea and vomiting since. The patient denies any recent sick contacts, or associated subjective fevers. (05 Aug 2017 04:02)        PHYSICAL EXAM:  Vital Signs Last 24 Hrs  T(C): 36.8 (05 Aug 2017 06:54), Max: 36.8 (05 Aug 2017 06:54)  T(F): 98.2 (05 Aug 2017 06:54), Max: 98.2 (05 Aug 2017 06:54)  HR: 87 (05 Aug 2017 06:54) (77 - 102)  BP: 107/37 (05 Aug 2017 06:54) (96/66 - 125/74)  BP(mean): --  RR: 16 (05 Aug 2017 06:54) (16 - 19)  SpO2: 100% (05 Aug 2017 06:54) (100% - 100%)  general- comfortable,dry appearance   -rs-aeeb,cta  -cvs-s1s2 normal   -p/a-soft,bs+  -extremity- no asymmetrical swelling noted   -cns- non focal         A/P    # Dehydration due to diarrhea- replace with iv fluids and monitor her diarrhea   -replace electrolytes   -change iv fluids to 1/2 ns with potasium chloride in it    #Diarrhea- due to chemo induced possibly, C diff to follow   -if negative for c diff then antidiarrheal meds     #dvt pr Pt has been seen and examined with FP resident, resident supervised agree with a/p       Patient is a 61y old  Female who presents with a chief complaint of came with nausea and vomitting due to bowel obstruction (05 Aug 2017 06:25)        HPI:  60 y/o F PMHx significant for recently diagnosed Ovarian cancer who was referred to the ED by her Oncologist for further evaluation of dehydration and diarrhea. The patient last treatment with chemotherapy was last Friday. The patient notes that she has had persistent watery diarrhea associated w/ intractable nausea and vomiting since. The patient denies any recent sick contacts, or associated subjective fevers. (05 Aug 2017 04:02)        PHYSICAL EXAM:  Vital Signs Last 24 Hrs  T(C): 36.8 (05 Aug 2017 06:54), Max: 36.8 (05 Aug 2017 06:54)  T(F): 98.2 (05 Aug 2017 06:54), Max: 98.2 (05 Aug 2017 06:54)  HR: 87 (05 Aug 2017 06:54) (77 - 102)  BP: 107/37 (05 Aug 2017 06:54) (96/66 - 125/74)  BP(mean): --  RR: 16 (05 Aug 2017 06:54) (16 - 19)  SpO2: 100% (05 Aug 2017 06:54) (100% - 100%)  general- comfortable,dry appearance   -rs-aeeb,cta  -cvs-s1s2 normal   -p/a-soft,bs+  -extremity- no asymmetrical swelling noted   -cns- non focal         A/P    # Dehydration due to diarrhea- replace with iv fluids and monitor her diarrhea   -replace electrolytes   -change iv fluids to 1/2 ns with potasium chloride in it    #Diarrhea- due to chemo induced possibly, C diff to follow   -if negative for c diff then antidiarrheal meds     #Drop in h/h- most likely dilutional -monitor it     #dvt pr

## 2017-08-05 NOTE — PROGRESS NOTE ADULT - ASSESSMENT
60 y/o F PMHx significant for recently diagnosed Ovarian cancer who was referred to the ED by her Oncologist for further evaluation of dehydration and diarrhea..    1)Intractable Nausea/Vomiting/Diarrhea likely chemotherapy induced  ~admit to Medicine  ~cont. IVF rehydration  ~strict I/Os  ~cont. anti-emetics prn  ~cont. pain management prn    2)Electrolyte disturbances; Hypokalemia  ~cont. to replete  ~f/u repeat lytes    3)Vte ppx  ~cont. LMWH sq

## 2017-08-05 NOTE — DIETITIAN INITIAL EVALUATION ADULT. - NS AS NUTRI DX NUTRIENT
Pt meets criteria for severe protein-calorie malnutrition in context of chronic disease. Pt has ovarian cancer. NFPE reveals severe muscle wasting in scapula, clavicles, shoulders, acromion process, thighs, severe fat depletion in orbital area, triceps, ribs.  Edema in lower extremities.   Wt has been erratic in past several months./Malnutrition Malnutrition/NFPE reveals severe muscle wasting in scapula, clavicles, shoulders, acromion process, thighs, severe fat depletion in orbital area, triceps, ribs. Pt meets criteria for severe protein-calorie malnutrition in context of chronic disease secondary to severe muscle and fat loss, and significant weight loss.

## 2017-08-05 NOTE — DIETITIAN INITIAL EVALUATION ADULT. - OTHER INFO
Nutrition consult received secondary to unintentional weight loss. Nutrition consult received secondary to unintentional weight loss. Pt with significant weight loss x 9 days (10%) secondary to decreased po intake(nausea) and diarrhea. History of ovarian CA (S/P chemo tx last Friday). Skin intact w/ no edema noted at this time. Current diet rx clears, as per pt tolerating well. Nausea and diarrhea improving. NFPE reveals severe muscle wasting in scapula, clavicles, shoulders, acromion process, thighs, severe fat depletion in orbital area, triceps, ribs. Pt meets criteria for severe protein-calorie malnutrition in context of chronic disease secondary to severe muscle and fat loss, and significant weight loss. Suggest additional supplementation Ensure clear 6oz. po TID, when diet advanced change supplementation to Ensure enlive 8oz. po TID (more nutrient/calorie/protein dense supplement).

## 2017-08-05 NOTE — H&P ADULT - NSHPPHYSICALEXAM_GEN_ALL_CORE
Vital Signs Last 24 Hrs  T(C): 36.6 (05 Aug 2017 00:04), Max: 36.6 (05 Aug 2017 00:04)  T(F): 97.8 (05 Aug 2017 00:04), Max: 97.8 (05 Aug 2017 00:04)  HR: 77 (05 Aug 2017 03:01) (77 - 102)  BP: 113/51 (05 Aug 2017 03:01) (96/66 - 125/74)  RR: 16 (05 Aug 2017 03:01) (16 - 19)  SpO2: 100% (05 Aug 2017 03:01) (100% - 100%)

## 2017-08-05 NOTE — CHART NOTE - NSCHARTNOTEFT_GEN_A_CORE
Upon Nutritional Assessment by the Registered Dietitian your patient was determined to meet criteria has evidence of the following diagnosis/diagnoses:        [ ]  Mild Protein Calorie Malnutrition        [ ]  Moderate Protein Calorie Malnutrition        [x ] Severe Protein Calorie Malnutrition        [ ] Unspecified Protein Calorie Malnutrition    Findings as based on:  •  Comprehensive nutrition assessment and Nutrition focused physical examination  •  Insufficient food/energy intake  •  Weight loss over time(Please specify time period)  •  Loss of muscle mass  •  Loss of fat mass  •  Fluid accumulation    Findings relevant to patient:  1. severe muscle wasting  2.severe fat loss  3 significant wt loss  4. poor intake <75% consumption  5. Diarrhea w/ episodes of nausea  6. clear liquid diet (not meeting est. needs)      Nutrition Interventions:  1. Advance diet to regular when feasible  2. Initiate Ensure clear 6oz. po TID and when dt advanced change to Ensure enlive 8oz. po TID (more nutrient dense supp.)  3.    TO BE COMPLETED BY PROVIDER:          [ ] Agree:         [ ] Disagree:    Comments:

## 2017-08-05 NOTE — PROGRESS NOTE ADULT - PROBLEM SELECTOR PLAN 1
- resolving  - possible chemotherapy induced. Will follow up c.diff  switch IVF  to 1/2 NS with 20 KCL  ~strict I/Os  ~cont. anti-emetics prn  ~cont. pain management prn  - if c.diff is negative then start loperamide if diarrhea is not controlled  - heme/onc input appreciated

## 2017-08-05 NOTE — DIETITIAN INITIAL EVALUATION ADULT. - SIGNS/SYMPTOMS
BMI of 17.9, NFPE, recent decrease in PO intake, BMI 16.4, severe muscle wasting, severe fat loss, recent decrease in PO intake, Significant wt loss

## 2017-08-05 NOTE — H&P ADULT - ASSESSMENT
62 y/o F PMHx significant for recently diagnosed Ovarian cancer who was referred to the ED by her Oncologist for further evaluation of dehydration and diarrhea. The patient last treatment with chemotherapy was last Friday. The patient notes that she hasa had persistent watery diarrhea associated w/ intractable nausea and vomiting since. The patient denies any recent sick contacts, or associated subjective fevers.    1)Intractable Nausea/Vomiting/Diarrhea likely chemotherapy induced  ~admit to Medicine  ~cont. IVF rehydration  ~strict I/Os  ~cont. anti-emetics prn  ~cont. pain management prn    2)Electrolyte disturbances; Hypokalemia  ~cont. to replete  ~f/u repeat lytes    3)Vte ppx  ~cont. LMWH sq

## 2017-08-05 NOTE — CONSULT NOTE ADULT - ASSESSMENT
Ovarian cancer: Stage III S/P 2 cycles of chemotherapy; will recheck   Diarrhea: secondary to chemotherapy ? need  to check for C diff; if negative: imodium etc   Dehydration: IV FLUIDS  Hypokalemia: Secondary to diarrhea; replace IV / PO

## 2017-08-05 NOTE — DIETITIAN INITIAL EVALUATION ADULT. - NS AS NUTRI INTERV MEDICAL AND FOOD SUPPLEMENTS
Ensure Enlive 8 oz tid, start  when pt is on full liquids/Commercial beverage Commercial beverage/Ensure Clear 8 oz tid, when dt advanced change to Ensure enlive 8oz. po TID

## 2017-08-05 NOTE — PROGRESS NOTE ADULT - SUBJECTIVE AND OBJECTIVE BOX
Patient is a 61y old  Female who presents with a chief complaint of came with nausea and vomitting due to bowel obstruction (05 Aug 2017 06:25)        HPI:  60 y/o F PMHx significant for recently diagnosed Ovarian cancer who was referred to the ED by her Oncologist for further evaluation of dehydration and diarrhea. The patient last treatment with chemotherapy was last Friday. The patient notes that she has had persistent watery diarrhea associated w/ intractable nausea and vomiting since. The patient denies any recent sick contacts, or associated subjective fevers. (05 Aug 2017 04:02)      17: Pt seen and examined at bedside. Reports not having any bowel movements today. No fever, abd pain or nausea. Tolerating clear diet without any issues    T(C): 36.8 (17 @ 13:04), Max: 36.8 (17 @ 06:54)  HR: 79 (17 @ 13:04) (77 - 102)  BP: 98/54 (17 @ 13:04) (96/66 - 125/74)  RR: 17 (17 @ 13:04) (16 - 19)  SpO2: 100% (17 @ 13:04) (100% - 100%)  Wt(kg): --    MEDICATIONS  (STANDING):  enoxaparin Injectable 40 milliGRAM(s) SubCutaneous every 24 hours  sodium chloride 0.45% with potassium chloride 20 mEq/L 1000 milliLiter(s) (100 mL/Hr) IV Continuous <Continuous>    MEDICATIONS  (PRN):  acetaminophen   Tablet 650 milliGRAM(s) Oral every 6 hours PRN For Temp greater than 38 C (100.4 F)  acetaminophen   Tablet. 650 milliGRAM(s) Oral every 6 hours PRN Mild Pain (1 - 3)  ondansetron Injectable 4 milliGRAM(s) IV Push every 6 hours PRN Nausea      RADIOLOGY & ADDITIONAL TESTS:      PHYSICAL EXAM:    GENERAL: NAD, well-groomed, well-developed  HEAD:  Atraumatic, Normocephalic  EYES: EOMI, PERRLA, conjunctiva and sclera clear  ENMT: Moist mucous membranes  NECK: Supple, No JVD  NERVOUS SYSTEM:  Alert & Oriented X3, Motor Strength 5/5 B/L upper and lower extremities; DTRs 2+ intact and symmetric  CHEST/LUNG: Clear to auscultation bilaterally; No rales, rhonchi, wheezing, or rubs  HEART: Regular rate and rhythm; No murmurs, rubs, or gallops  ABDOMEN: Soft, Nontender, Nondistended; Bowel sounds present  EXTREMITIES:  2+ Peripheral Pulses, No clubbing, cyanosis, or edema    Daily Height in cm: 170.18 (04 Aug 2017 19:57)    Daily Weight in k.5 (05 Aug 2017 08:29)      enoxaparin Injectable 40 milliGRAM(s) SubCutaneous every 24 hours  acetaminophen   Tablet 650 milliGRAM(s) Oral every 6 hours PRN  acetaminophen   Tablet. 650 milliGRAM(s) Oral every 6 hours PRN  ondansetron Injectable 4 milliGRAM(s) IV Push every 6 hours PRN  sodium chloride 0.45% with potassium chloride 20 mEq/L 1000 milliLiter(s) (100 mL/Hr) IV Continuous <Continuous>          Lab Results:  CBC  CBC Full  -  ( 04 Aug 2017 20:21 )  WBC Count : 3.0 K/uL  Hemoglobin : 11.5 g/dL  Hematocrit : 34.0 %  Platelet Count - Automated : 388 K/uL  Mean Cell Volume : 85.4 fl  Mean Cell Hemoglobin : 28.7 pg  Mean Cell Hemoglobin Concentration : 33.6 gm/dL  Auto Neutrophil # : 1.8 K/uL  Auto Lymphocyte # : 0.7 K/uL  Auto Monocyte # : 0.3 K/uL  Auto Eosinophil # : 0.0 K/uL  Auto Basophil # : 0.0 K/uL  Auto Neutrophil % : 61.7 %  Auto Lymphocyte % : 25.3 %  Auto Monocyte % : 11.1 %  Auto Eosinophil % : 1.0 %  Auto Basophil % : 0.9 %    .		Differential:	[] Automated		[] Manual  Chemistry      135  |  109<H>  |  17  ----------------------------<  87  2.8<LL>   |  16<L>  |  0.58    Ca    8.4<L>      05 Aug 2017 07:24  Mg     1.8         TPro  6.6  /  Alb  2.6<L>  /  TBili  0.5  /  DBili  x   /  AST  23  /  ALT  25  /  AlkPhos  128<H>      LIVER FUNCTIONS - ( 05 Aug 2017 07:24 )  Alb: 2.6 g/dL / Pro: 6.6 gm/dL / ALK PHOS: 128 U/L / ALT: 25 U/L / AST: 23 U/L / GGT: x                 MICROBIOLOGY/CULTURES:    RADIOLOGY RESULTS:    Toxicities (with grade)  1.  2.  3.  4.

## 2017-08-05 NOTE — CONSULT NOTE ADULT - SUBJECTIVE AND OBJECTIVE BOX
HPI:  62 y/o F PMHx significant for recently diagnosed Ovarian cancer she presented with massive ascites, abdominal distension and bilateral ovarian masses; she underwent paracentesis and pathology was c/w ovarian cancer ( Tumor  positive, CD 20 negative); 7  weeks ago started Carboplatin and weekly  Paclitaxel; 1 1/2 weeks ago admitted with umbilical hernia and partial SBO: treated conservatively: NGT, paracentesis and subsequent cycle 2 of chemotherapy: Carboplatin and Paclitaxel, latter given as one dose rather than weekly; since last chemotherapy has had diarrhea; was in the office for fluids and anti emetics; despite which the diarrhea has been persistent and associated with weight loss, fatigue: advised admission for work up / hydration, supportive care    PAST MEDICAL & SURGICAL HISTORY:  Ovarian cancer  History of tonsillectomy      MEDICATIONS  (STANDING):  enoxaparin Injectable 40 milliGRAM(s) SubCutaneous every 24 hours  sodium chloride 0.9%. 1000 milliLiter(s) (100 mL/Hr) IV Continuous <Continuous>  potassium chloride  10 mEq/100 mL IVPB 10 milliEquivalent(s) IV Intermittent every 1 hour    MEDICATIONS  (PRN):  acetaminophen   Tablet 650 milliGRAM(s) Oral every 6 hours PRN For Temp greater than 38 C (100.4 F)  acetaminophen   Tablet. 650 milliGRAM(s) Oral every 6 hours PRN Mild Pain (1 - 3)  ondansetron Injectable 4 milliGRAM(s) IV Push every 6 hours PRN Nausea      Allergies    No Known Allergies    Intolerances        SOCIAL HISTORY:    FAMILY HISTORY:  Family history of esophageal cancer (Mother)  Family history of Parkinson disease (Father)  Family history of prostate cancer (Father)  Family history of hypertension (Father)      Appetite decreased since diarrhea  Generally weak'  No fevers, chills, less nausea  No abdominal pains  NO increased girth  No blood in stools  No pelvic cramping  No spotting    Vital Signs Last 24 Hrs  T(C): 36.8 (05 Aug 2017 06:54), Max: 36.8 (05 Aug 2017 06:54)  T(F): 98.2 (05 Aug 2017 06:54), Max: 98.2 (05 Aug 2017 06:54)  HR: 87 (05 Aug 2017 06:54) (77 - 102)  BP: 107/37 (05 Aug 2017 06:54) (96/66 - 125/74)  BP(mean): --  RR: 16 (05 Aug 2017 06:54) (16 - 19)  SpO2: 100% (05 Aug 2017 06:54) (100% - 100%)      Appears chronically ill  NAD  VSS  HEENT neg  Lungs clear  Cor no murmurs  Abd soft nontender BS +  Ext neg phlebitis  LABS:                        11.5   3.0   )-----------( 388      ( 04 Aug 2017 20:21 )             34.0     08-05    135  |  109<H>  |  17  ----------------------------<  87  2.8<LL>   |  16<L>  |  0.58    Ca    8.4<L>      05 Aug 2017 07:24  Mg     1.8     08-05    TPro  6.6  /  Alb  2.6<L>  /  TBili  0.5  /  DBili  x   /  AST  23  /  ALT  25  /  AlkPhos  128<H>  08-05

## 2017-08-05 NOTE — H&P ADULT - HISTORY OF PRESENT ILLNESS
60 y/o F PMHx significant for recently diagnosed Ovarian cancer who was referred to the ED by her Oncologist for further evaluation of dehydration and diarrhea. The patient last treatment with chemotherapy was last Friday. The patient notes that she has had persistent watery diarrhea associated w/ intractable nausea and vomiting since. The patient denies any recent sick contacts, or associated subjective fevers.

## 2017-08-05 NOTE — DIETITIAN INITIAL EVALUATION ADULT. - NS FNS SUPPLEMENTS
Ensure Enlive®/8 oz tid, when pt is advance to full liquid diet Ensure clear 6oz. po TID and when diet advanced initiate Ensure enlive 8oz. po TID

## 2017-08-06 LAB
ANION GAP SERPL CALC-SCNC: 9 MMOL/L — SIGNIFICANT CHANGE UP (ref 5–17)
BUN SERPL-MCNC: 10 MG/DL — SIGNIFICANT CHANGE UP (ref 7–23)
C DIFF BY PCR RESULT: SIGNIFICANT CHANGE UP
C DIFF TOX GENS STL QL NAA+PROBE: SIGNIFICANT CHANGE UP
CALCIUM SERPL-MCNC: 8 MG/DL — LOW (ref 8.5–10.1)
CHLORIDE SERPL-SCNC: 110 MMOL/L — HIGH (ref 96–108)
CO2 SERPL-SCNC: 19 MMOL/L — LOW (ref 22–31)
CREAT SERPL-MCNC: 0.46 MG/DL — LOW (ref 0.5–1.3)
GLUCOSE SERPL-MCNC: 85 MG/DL — SIGNIFICANT CHANGE UP (ref 70–99)
HCT VFR BLD CALC: 25.7 % — LOW (ref 34.5–45)
HGB BLD-MCNC: 8.8 G/DL — LOW (ref 11.5–15.5)
MAGNESIUM SERPL-MCNC: 1.6 MG/DL — SIGNIFICANT CHANGE UP (ref 1.6–2.6)
MCHC RBC-ENTMCNC: 28.6 PG — SIGNIFICANT CHANGE UP (ref 27–34)
MCHC RBC-ENTMCNC: 34.2 GM/DL — SIGNIFICANT CHANGE UP (ref 32–36)
MCV RBC AUTO: 83.7 FL — SIGNIFICANT CHANGE UP (ref 80–100)
PHOSPHATE SERPL-MCNC: 1.7 MG/DL — LOW (ref 2.5–4.5)
PLATELET # BLD AUTO: 252 K/UL — SIGNIFICANT CHANGE UP (ref 150–400)
POTASSIUM SERPL-MCNC: 3.1 MMOL/L — LOW (ref 3.5–5.3)
POTASSIUM SERPL-SCNC: 3.1 MMOL/L — LOW (ref 3.5–5.3)
RBC # BLD: 3.07 M/UL — LOW (ref 3.8–5.2)
RBC # FLD: 16.4 % — HIGH (ref 10.3–14.5)
SODIUM SERPL-SCNC: 138 MMOL/L — SIGNIFICANT CHANGE UP (ref 135–145)
WBC # BLD: 1.7 K/UL — LOW (ref 3.8–10.5)
WBC # FLD AUTO: 1.7 K/UL — LOW (ref 3.8–10.5)

## 2017-08-06 RX ORDER — LOPERAMIDE HCL 2 MG
2 TABLET ORAL EVERY 4 HOURS
Qty: 0 | Refills: 0 | Status: DISCONTINUED | OUTPATIENT
Start: 2017-08-06 | End: 2017-08-07

## 2017-08-06 RX ORDER — POTASSIUM PHOSPHATE, MONOBASIC POTASSIUM PHOSPHATE, DIBASIC 236; 224 MG/ML; MG/ML
15 INJECTION, SOLUTION INTRAVENOUS ONCE
Qty: 0 | Refills: 0 | Status: COMPLETED | OUTPATIENT
Start: 2017-08-06 | End: 2017-08-06

## 2017-08-06 RX ORDER — POTASSIUM CHLORIDE 20 MEQ
10 PACKET (EA) ORAL
Qty: 0 | Refills: 0 | Status: COMPLETED | OUTPATIENT
Start: 2017-08-06 | End: 2017-08-06

## 2017-08-06 RX ADMIN — POTASSIUM PHOSPHATE, MONOBASIC POTASSIUM PHOSPHATE, DIBASIC 62.5 MILLIMOLE(S): 236; 224 INJECTION, SOLUTION INTRAVENOUS at 13:10

## 2017-08-06 RX ADMIN — DEXTROSE MONOHYDRATE, SODIUM CHLORIDE, AND POTASSIUM CHLORIDE 100 MILLILITER(S): 50; .745; 4.5 INJECTION, SOLUTION INTRAVENOUS at 02:30

## 2017-08-06 RX ADMIN — Medication 100 MILLIEQUIVALENT(S): at 16:16

## 2017-08-06 RX ADMIN — ENOXAPARIN SODIUM 40 MILLIGRAM(S): 100 INJECTION SUBCUTANEOUS at 09:12

## 2017-08-06 RX ADMIN — DEXTROSE MONOHYDRATE, SODIUM CHLORIDE, AND POTASSIUM CHLORIDE 100 MILLILITER(S): 50; .745; 4.5 INJECTION, SOLUTION INTRAVENOUS at 18:35

## 2017-08-06 RX ADMIN — Medication 100 MILLIEQUIVALENT(S): at 18:29

## 2017-08-06 RX ADMIN — Medication 100 MILLIEQUIVALENT(S): at 13:11

## 2017-08-06 NOTE — PROGRESS NOTE ADULT - PROBLEM SELECTOR PLAN 1
- resolving, most likely secondary to chemotherapy  -CDiff negative - d/c isolation precautions.   switch IVF  to 1/2 NS with 20 KCL  ~strict I/Os  ~cont. anti-emetics prn  ~cont. pain management prn  - if c.diff is negative then start loperamide if diarrhea is not controlled  - heme/onc input appreciated - resolving, most likely secondary to chemotherapy  -CDiff negative - d/c isolation precautions.   -loperamide PRN for diarrhea  switch IVF  to 1/2 NS with 20 KCL  ~cont. anti-emetics prn  ~cont. pain management prn  - heme/onc input appreciated - f/u

## 2017-08-06 NOTE — PROGRESS NOTE ADULT - SUBJECTIVE AND OBJECTIVE BOX
CHIEF COMPLAINT: nausea and vomiting     SUBJECTIVE:   62 y/o F PMHx significant for recently diagnosed Ovarian cancer who was referred to the ED by her Oncologist for further evaluation of dehydration and diarrhea. The patient last treatment with chemotherapy was last Friday. The patient notes that she has had persistent watery diarrhea associated w/ intractable nausea and vomiting since. The patient denies any recent sick contacts, or associated subjective fevers. (05 Aug 2017 04:02)      8/5/17: Pt seen and examined at bedside. Reports not having any bowel movements today. No fever, abd pain or nausea. Tolerating clear diet without any issues    8/6/17 - Patient seen and examined at bedside. Patient states that she feels better, although she still continue to have some diarrhea.  She is hungry and asking for food and denies abdominal pain.  No overnight events.     REVIEW OF SYSTEMS:    CONSTITUTIONAL: No weakness, fevers or chills  EYES/ENT: No visual changes;  No vertigo or throat pain   NECK: No pain or stiffness  RESPIRATORY: No cough, wheezing, hemoptysis; No shortness of breath  CARDIOVASCULAR: No chest pain or palpitations  GASTROINTESTINAL: No abdominal or epigastric pain. No nausea, vomiting, or hematemesis; + diarrhea denies constipation. No melena or hematochezia.  GENITOURINARY: No dysuria, frequency or hematuria  NEUROLOGICAL: No numbness or weakness  SKIN: No itching, burning, rashes, or lesions   All other review of systems is negative unless indicated above    Vital Signs Last 24 Hrs  T(C): 36.4 (06 Aug 2017 04:59), Max: 36.8 (05 Aug 2017 13:04)  T(F): 97.5 (06 Aug 2017 04:59), Max: 98.2 (05 Aug 2017 13:04)  HR: 85 (06 Aug 2017 04:59) (79 - 85)  BP: 100/40 (06 Aug 2017 04:59) (98/54 - 105/46)  BP(mean): --  RR: 16 (06 Aug 2017 04:59) (16 - 17)  SpO2: 99% (06 Aug 2017 04:59) (99% - 100%)    I&O's Summary    05 Aug 2017 07:01  -  06 Aug 2017 07:00  --------------------------------------------------------  IN: 1655 mL / OUT: 0 mL / NET: 1655 mL        CAPILLARY BLOOD GLUCOSE          PHYSICAL EXAM:    Constitutional: NAD, awake and alert, well-developed  HEENT: PERR, EOMI, Normal Hearing, MMM  Neck: Soft and supple, No LAD, No JVD  Respiratory: Breath sounds are clear bilaterally, No wheezing, rales or rhonchi  Cardiovascular: S1 and S2, regular rate and rhythm, no Murmurs, gallops or rubs  Gastrointestinal: Bowel Sounds present, soft, nontender, nondistended, no guarding, no rebound  Extremities: No peripheral edema  Vascular: 2+ peripheral pulses  Neurological: A/O x 3, no focal deficits  Musculoskeletal: 5/5 strength b/l upper and lower extremities  Skin: No rashes    MEDICATIONS:  MEDICATIONS  (STANDING):  enoxaparin Injectable 40 milliGRAM(s) SubCutaneous every 24 hours  sodium chloride 0.45% with potassium chloride 20 mEq/L 1000 milliLiter(s) (100 mL/Hr) IV Continuous <Continuous>      LABS: All Labs Reviewed:                        8.8    1.7   )-----------( 252      ( 06 Aug 2017 05:43 )             25.7     08-06    138  |  110<H>  |  10  ----------------------------<  85  3.1<L>   |  19<L>  |  0.46<L>    Ca    8.0<L>      06 Aug 2017 05:43  Phos  1.7     08-06  Mg     1.6     08-06    TPro  6.6  /  Alb  2.6<L>  /  TBili  0.5  /  DBili  x   /  AST  23  /  ALT  25  /  AlkPhos  128<H>  08-05          Blood Culture:   Clostridium difficile Toxin by PCR (08.05.17 @ 07:00)    C Diff by PCR Result: NotDetec    Clostridium difficile Toxin by PCR: The results of this test should be interpreted with consideration of all  clinical and laboratory findings. This test determines the presence of  the C. difficile tcdB gene at a given time and is not intended to  identify antibiotic associated disease or C. difficile infection without  clinical context. Successful treatment is based on the resolution of  clinical symptoms. This test should not be used as a "test of cure"  because C. difficile DNA will persist after successful treatment. Repeat  testing will not be permitted.    This test is performed on the BD MAX system using Real-Time PCR and  fluorogenic target-specific hybridization.        RADIOLOGY/EKG:    DVT PPX:    ADVANCED DIRECTIVE:    DISPOSITION:

## 2017-08-06 NOTE — PROGRESS NOTE ADULT - PROBLEM SELECTOR PLAN 3
-resolved  - clear liquid diet and advance as tolerated  - 1/2 NS with kcl at 100 -resolved  -regular diet diet  - 1/2 NS with kcl at 100

## 2017-08-06 NOTE — PROGRESS NOTE ADULT - SUBJECTIVE AND OBJECTIVE BOX
Pt has been seen and examined with FP resident, resident supervised agree with a/p       Patient is a 61y old  Female who presents with a chief complaint of came with nausea and vomitting due to bowel obstruction (05 Aug 2017 06:25)        HPI:  62 y/o F PMHx significant for recently diagnosed Ovarian cancer who was referred to the ED by her Oncologist for further evaluation of dehydration and diarrhea. The patient last treatment with chemotherapy was last Friday. The patient notes that she has had persistent watery diarrhea associated w/ intractable nausea and vomiting since. The patient denies any recent sick contacts, or associated subjective fevers. (05 Aug 2017 04:02)        PHYSICAL EXAM:  Vital Signs Last 24 Hrs  T(C): 36.4 (06 Aug 2017 04:59), Max: 36.8 (05 Aug 2017 13:04)  T(F): 97.5 (06 Aug 2017 04:59), Max: 98.2 (05 Aug 2017 13:04)  HR: 85 (06 Aug 2017 04:59) (79 - 85)  BP: 100/40 (06 Aug 2017 04:59) (98/54 - 105/46)  BP(mean): --  RR: 16 (06 Aug 2017 04:59) (16 - 17)  SpO2: 99% (06 Aug 2017 04:59) (99% - 100%)  general- comfortable,dry appearance   -rs-aeeb,cta  -cvs-s1s2 normal   -p/a-soft,bs+  -extremity- no asymmetrical swelling noted   -cns- non focal         A/P    # Dehydration due to diarrhea-ct iv fluids, appears better today, advance diet to normal     #Diarrhea- due to chemo induced  -start loperamide and monitor her     #dvt pr    #if diarrhea better by tomorrow- d/c home

## 2017-08-06 NOTE — PROGRESS NOTE ADULT - PROBLEM SELECTOR PLAN 2
- likely due to diarrhea and vomiting  -k: 2.8. repleted today  - continue 1/2 NS with kcl - likely due to diarrhea and vomiting  -k: 3.1 repleted today  - continue 1/2 NS with kcl

## 2017-08-06 NOTE — PROGRESS NOTE ADULT - ASSESSMENT
62 y/o F PMHx significant for recently diagnosed Ovarian cancer who was referred to the ED by her Oncologist for further evaluation of dehydration and diarrhea..    1)Intractable Nausea/Vomiting/Diarrhea likely chemotherapy induced  ~admit to Medicine  ~cont. IVF rehydration  ~strict I/Os  ~cont. anti-emetics prn  ~cont. pain management prn    2)Electrolyte disturbances; Hypokalemia  ~cont. to replete  ~f/u repeat lytes    3)Vte ppx  ~cont. LMWH sq 62 y/o F PMHx significant for recently diagnosed Ovarian cancer who was referred to the ED by her Oncologist for further evaluation of dehydration and diarrhea..

## 2017-08-07 ENCOUNTER — TRANSCRIPTION ENCOUNTER (OUTPATIENT)
Age: 62
End: 2017-08-07

## 2017-08-07 VITALS
DIASTOLIC BLOOD PRESSURE: 53 MMHG | HEART RATE: 73 BPM | SYSTOLIC BLOOD PRESSURE: 116 MMHG | OXYGEN SATURATION: 99 % | TEMPERATURE: 98 F | RESPIRATION RATE: 17 BRPM

## 2017-08-07 DIAGNOSIS — E43 UNSPECIFIED SEVERE PROTEIN-CALORIE MALNUTRITION: ICD-10-CM

## 2017-08-07 DIAGNOSIS — C56.9 MALIGNANT NEOPLASM OF UNSPECIFIED OVARY: ICD-10-CM

## 2017-08-07 DIAGNOSIS — D72.819 DECREASED WHITE BLOOD CELL COUNT, UNSPECIFIED: ICD-10-CM

## 2017-08-07 DIAGNOSIS — D64.9 ANEMIA, UNSPECIFIED: ICD-10-CM

## 2017-08-07 DIAGNOSIS — E83.39 OTHER DISORDERS OF PHOSPHORUS METABOLISM: ICD-10-CM

## 2017-08-07 DIAGNOSIS — E86.0 DEHYDRATION: ICD-10-CM

## 2017-08-07 LAB
ANION GAP SERPL CALC-SCNC: 9 MMOL/L — SIGNIFICANT CHANGE UP (ref 5–17)
BUN SERPL-MCNC: 10 MG/DL — SIGNIFICANT CHANGE UP (ref 7–23)
CALCIUM SERPL-MCNC: 7.8 MG/DL — LOW (ref 8.5–10.1)
CHLORIDE SERPL-SCNC: 110 MMOL/L — HIGH (ref 96–108)
CO2 SERPL-SCNC: 19 MMOL/L — LOW (ref 22–31)
CREAT SERPL-MCNC: 0.41 MG/DL — LOW (ref 0.5–1.3)
GLUCOSE SERPL-MCNC: 99 MG/DL — SIGNIFICANT CHANGE UP (ref 70–99)
HCT VFR BLD CALC: 25.2 % — LOW (ref 34.5–45)
HGB BLD-MCNC: 8.7 G/DL — LOW (ref 11.5–15.5)
MAGNESIUM SERPL-MCNC: 1.5 MG/DL — LOW (ref 1.6–2.6)
MCHC RBC-ENTMCNC: 28.6 PG — SIGNIFICANT CHANGE UP (ref 27–34)
MCHC RBC-ENTMCNC: 34.4 GM/DL — SIGNIFICANT CHANGE UP (ref 32–36)
MCV RBC AUTO: 83 FL — SIGNIFICANT CHANGE UP (ref 80–100)
PHOSPHATE SERPL-MCNC: 2 MG/DL — LOW (ref 2.5–4.5)
PLATELET # BLD AUTO: 232 K/UL — SIGNIFICANT CHANGE UP (ref 150–400)
POTASSIUM SERPL-MCNC: 3.2 MMOL/L — LOW (ref 3.5–5.3)
POTASSIUM SERPL-SCNC: 3.2 MMOL/L — LOW (ref 3.5–5.3)
RBC # BLD: 3.04 M/UL — LOW (ref 3.8–5.2)
RBC # FLD: 16.8 % — HIGH (ref 10.3–14.5)
SODIUM SERPL-SCNC: 138 MMOL/L — SIGNIFICANT CHANGE UP (ref 135–145)
WBC # BLD: 2.2 K/UL — LOW (ref 3.8–10.5)
WBC # FLD AUTO: 2.2 K/UL — LOW (ref 3.8–10.5)

## 2017-08-07 RX ORDER — SODIUM,POTASSIUM PHOSPHATES 278-250MG
1 POWDER IN PACKET (EA) ORAL ONCE
Qty: 0 | Refills: 0 | Status: COMPLETED | OUTPATIENT
Start: 2017-08-07 | End: 2017-08-07

## 2017-08-07 RX ORDER — MAGNESIUM SULFATE 500 MG/ML
2 VIAL (ML) INJECTION ONCE
Qty: 0 | Refills: 0 | Status: COMPLETED | OUTPATIENT
Start: 2017-08-07 | End: 2017-08-07

## 2017-08-07 RX ORDER — LOPERAMIDE HCL 2 MG
1 TABLET ORAL
Qty: 30 | Refills: 0 | OUTPATIENT
Start: 2017-08-07

## 2017-08-07 RX ORDER — POTASSIUM CHLORIDE 20 MEQ
40 PACKET (EA) ORAL ONCE
Qty: 0 | Refills: 0 | Status: COMPLETED | OUTPATIENT
Start: 2017-08-07 | End: 2017-08-07

## 2017-08-07 RX ADMIN — DEXTROSE MONOHYDRATE, SODIUM CHLORIDE, AND POTASSIUM CHLORIDE 100 MILLILITER(S): 50; .745; 4.5 INJECTION, SOLUTION INTRAVENOUS at 04:20

## 2017-08-07 RX ADMIN — Medication 1 TABLET(S): at 16:49

## 2017-08-07 RX ADMIN — ENOXAPARIN SODIUM 40 MILLIGRAM(S): 100 INJECTION SUBCUTANEOUS at 07:40

## 2017-08-07 RX ADMIN — Medication 50 GRAM(S): at 16:44

## 2017-08-07 RX ADMIN — Medication 40 MILLIEQUIVALENT(S): at 14:34

## 2017-08-07 NOTE — PROGRESS NOTE ADULT - PROBLEM SELECTOR PLAN 4
- Stage III S/P 2 cycles of chemotherapy; will recheck 
- currently stable  - no abdominal symptoms, patient recently had paracentesis (7/27) at

## 2017-08-07 NOTE — DISCHARGE NOTE ADULT - CARE PLAN
Principal Discharge DX:	Dehydration  Goal:	- dehydration free  Instructions for follow-up, activity and diet:	- hydrated in the hospital  - most like induced by chemotherapy   - C. diff negative - d/c isolation precautions.   - loperamide PRN for diarrhea  - cont. anti-emetics prn  - cont. pain management prn  - heme/onc input appreciated - f/u .  - follow up with Dr. Read as an outpatient on wednesday  Secondary Diagnosis:	Anemia  Instructions for follow-up, activity and diet:	- Anemia.  Plan: - anemia of chronic dz / chemo induces  - H/H 8.7.  Secondary Diagnosis:	Hypokalemia  Instructions for follow-up, activity and diet:	- repleted  Secondary Diagnosis:	Hypophosphatemia  Instructions for follow-up, activity and diet:	- repleted  Secondary Diagnosis:	Ovarian cancer  Instructions for follow-up, activity and diet:	- Stage III S/P 2 cycles of chemotherapy  Secondary Diagnosis:	Severe protein-calorie malnutrition  Instructions for follow-up, activity and diet:	- maintain good po intake

## 2017-08-07 NOTE — DISCHARGE NOTE ADULT - PATIENT PORTAL LINK FT
“You can access the [facility] Patient Portal, offered by [facility], by registering with the following website: [email or web address]”

## 2017-08-07 NOTE — PROGRESS NOTE ADULT - PROBLEM SELECTOR PLAN 1
- resolving, most likely secondary to chemotherapy  -C Diff negative - d/c isolation precautions.   -loperamide PRN for diarrhea  switch IVF  to 1/2 NS with 20 KCL  ~cont. anti-emetics prn  ~cont. pain management prn  - heme/onc input appreciated - f/u  - patient pending discharge and follow up with Dr. Read on Wednesday 8/9  - resolving, most likely secondary to chemotherapy  -C Diff negative - d/c isolation precautions.   -loperamide PRN for diarrhea  switch IVF  to 1/2 NS with 20 KCL  ~cont. anti-emetics prn  ~cont. pain management prn  - heme/onc input appreciated - f/u

## 2017-08-07 NOTE — PROGRESS NOTE ADULT - PROBLEM SELECTOR PLAN 1
- most like induced by chemotherapy   - C. diff negative - d/c isolation precautions.   - loperamide PRN for diarrhea  - switch IVF  to 1/2 NS with 20 KCL  - cont. anti-emetics prn  - cont. pain management prn  - heme/onc input appreciated - f/u

## 2017-08-07 NOTE — PROGRESS NOTE ADULT - SUBJECTIVE AND OBJECTIVE BOX
CHIEF COMPLAINT: nausea and vomiting     Patient is a 60 y/o F PMHx significant for recently diagnosed Ovarian cancer who was referred to the ED by her Oncologist for further evaluation of dehydration and diarrhea. The patient last treatment with chemotherapy was last Friday. The patient notes that she has had persistent watery diarrhea associated w/ intractable nausea and vomiting since. The patient denies any recent sick contacts, or associated subjective fevers. (05 Aug 2017 04:02)    8/7:     REVIEW OF SYSTEMS:  General: NAD, hemodynamically stable, (-)  fever, (-) chills, (-) weakness  HEENT:  Eyes:  No visual loss, blurred vision, double vision or yellow sclerae. Ears, Nose, Throat:  No hearing loss, sneezing, congestion, runny nose or sore throat.  SKIN:  No rash or itching.  CARDIOVASCULAR:  No chest pain, chest pressure or chest discomfort. No palpitations or edema.  RESPIRATORY:  No shortness of breath, cough or sputum.  GASTROINTESTINAL:  No anorexia, nausea, vomiting or diarrhea. No abdominal pain or blood.  NEUROLOGICAL:  No headache, dizziness, syncope, paralysis, ataxia, numbness or tingling in the extremities. No change in bowel or bladder control.  MUSCULOSKELETAL:  No muscle, back pain, joint pain or stiffness.  HEMATOLOGIC:  No anemia, bleeding or bruising.  LYMPHATICS:  No enlarged nodes. No history of splenectomy.  ENDOCRINOLOGIC:  No reports of sweating, cold or heat intolerance. No polyuria or polydipsia.  ALLERGIES:  No history of asthma, hives, eczema or rhinitis.    PHYSICAL EXAM:   Constitutional: NAD, awake and alert, well-developed  HEENT: PERR, EOMI, Normal Hearing, MMM  Neck: Soft and supple, No LAD, No JVD  Respiratory: Breath sounds are clear bilaterally, No wheezing, rales or rhonchi  Cardiovascular: S1 and S2, regular rate and rhythm, no Murmurs, gallops or rubs  Gastrointestinal: Bowel Sounds present, soft, nontender, nondistended, no guarding, no rebound  Extremities: No peripheral edema  Vascular: 2+ peripheral pulses  Neurological: A/O x 3, no focal deficits  Musculoskeletal: 5/5 strength b/l upper and lower extremities  Skin: No rashes    Labs:   CBC Full  -  ( 07 Aug 2017 05:18 )  WBC Count : 2.2 K/uL  Hemoglobin : 8.7 g/dL  Hematocrit : 25.2 %  Platelet Count - Automated : 232 K/uL      138  |  110<H>  |  10  ----------------------------<  99  3.2<L>   |  19<L>  |  0.41<L>    Ca    7.8<L>      07 Aug 2017 05:18  Phos  2.0     08-07  Mg     1.5     08-07

## 2017-08-07 NOTE — PROGRESS NOTE ADULT - SUBJECTIVE AND OBJECTIVE BOX
HPI:  60 y/o F PMHx significant for recently diagnosed Ovarian cancer she presented with massive ascites, abdominal distension and bilateral ovarian masses; she underwent paracentesis and pathology was c/w ovarian cancer ( Tumor  positive, CD 20 negative); 7  weeks ago started Carboplatin and weekly  Paclitaxel; 1 1/2 weeks ago admitted with umbilical hernia and partial SBO: treated conservatively: NGT, paracentesis and subsequent cycle 2 of chemotherapy: Carboplatin and Paclitaxel, latter given as one dose rather than weekly; since last chemotherapy has had diarrhea; was in the office for fluids and anti emetics; despite which the diarrhea has been persistent and associated with weight loss, fatigue: advised admission for work up / hydration, supportive care    currently feels much better . no further diarrhea   tolerating po    PAST MEDICAL & SURGICAL HISTORY:  Ovarian cancer  History of tonsillectomy      MEDICATIONS  (STANDING):  enoxaparin Injectable 40 milliGRAM(s) SubCutaneous every 24 hours  sodium chloride 0.45% with potassium chloride 20 mEq/L 1000 milliLiter(s) (100 mL/Hr) IV Continuous <Continuous>        Allergies    No Known Allergies    Intolerances        SOCIAL HISTORY:    FAMILY HISTORY:  Family history of esophageal cancer (Mother)  Family history of Parkinson disease (Father)  Family history of prostate cancer (Father)  Family history of hypertension (Father)      Appetite improved.  Generally weak'  No fevers, chills, less nausea  No abdominal pains  NO increased girth  No blood in stools  No pelvic cramping  No spotting    Vital Signs Last 24 Hrs  T(C): 36.8 (07 Aug 2017 05:03), Max: 37.1 (06 Aug 2017 20:16)  T(F): 98.2 (07 Aug 2017 05:03), Max: 98.8 (06 Aug 2017 20:16)  HR: 77 (07 Aug 2017 05:03) (77 - 90)  BP: 108/52 (07 Aug 2017 05:03) (104/60 - 118/61)  BP(mean): --  RR: 18 (07 Aug 2017 05:03) (18 - 18)  SpO2: 100% (07 Aug 2017 05:03) (98% - 100%)        Appears chronically ill  NAD  VSS  HEENT neg  Lungs clear  Cor no murmurs  Abd soft nontender BS +  ascitis+  BS+  small umbilical hernia  Ext neg phlebitis  LABS:                                   8.7    2.2   )-----------( 232      ( 07 Aug 2017 05:18 )             25.2     08-07    138  |  110<H>  |  10  ----------------------------<  99  3.2<L>   |  19<L>  |  0.41<L>    Ca    7.8<L>      07 Aug 2017 05:18  Phos  2.0     08-07  Mg     1.5     08-07

## 2017-08-07 NOTE — CDI QUERY NOTE - NSCDIOTHERTXTBX_GEN_ALL_CORE_HH
As per RD nutrition assessment, " Malnutrition; NFPE reveals severe muscle wasting in scapula, clavicles, shoulders, acromion process, thighs, severe fat depletion in orbital area, triceps, ribs. Pt meets criteria for severe protein-calorie malnutrition in context of chronic disease secondary to severe muscle and fat loss, and significant weight loss."  Please agree or disagree with this finding in your notes or cosign nutrition assessment form and indicate at the bottom if you agree or disagree.

## 2017-08-07 NOTE — PROGRESS NOTE ADULT - PROBLEM SELECTOR PLAN 3
-resolved  -regular diet  - 1/2 NS with KCl at 100 -currently resolved  -regular diet  -Zofran given for intractable vomiting  - 1/2 NS with KCl at 100

## 2017-08-07 NOTE — DISCHARGE NOTE ADULT - HOSPITAL COURSE
Patient is a 62 y/o F PMHx significant for recently diagnosed Ovarian cancer who was referred to the ED by her Oncologist for further evaluation of dehydration and diarrhea. The patient last treatment with chemotherapy was last Friday. The patient notes that she has had persistent watery diarrhea associated w/ intractable nausea and vomiting since. The patient denies any recent sick contacts, or associated subjective fevers.    Seen and eval. hemodynamically stable. Denies any HA, CP, SOB. no fevers, chills or shakes. Patient's diarrhea has resolved. Oncology recommended discharge today with close follow up on wednesday. patient strongly recommended if she is unable to eat or has a decreased po intake to come back to the hospital. Patient understands the recommendations.     Physical Exam:   GENERAL APPEARANCE:  NAD, hemodynamically stable, cachectic  T(C): 36.5 (08-07-17 @ 12:52), Max: 37.1 (08-06-17 @ 20:16)  HR: 73 (08-07-17 @ 12:52) (73 - 89)  BP: 116/53 (08-07-17 @ 12:52) (104/60 - 116/53)  RR: 17 (08-07-17 @ 12:52) (17 - 18)  SpO2: 99% (08-07-17 @ 12:52) (98% - 100%)  HEENT:  Head is normocephalic, , temporal wasting  Skin:  Warm and dry without any rash   NECK:  Supple without lymphadenopathy.   HEART:  Regular rate and rhythm. normal S1 and S2, No M/R/G  LUNGS:  Good ins/exp effort, no W/R/R/C  ABDOMEN:  Soft, nontender, nondistended with good bowel sounds heard  EXTREMITIES:  Without cyanosis, clubbing or edema.   NEUROLOGICAL:  Gross nonfocal       CBC Full  -  ( 07 Aug 2017 05:18 )  WBC Count : 2.2 K/uL  Hemoglobin : 8.7 g/dL  Hematocrit : 25.2 %  Platelet Count - Automated : 232 K/uL  Mean Cell Volume : 83.0 fl  Mean Cell Hemoglobin : 28.6 pg  Mean Cell Hemoglobin Concentration : 34.4 gm/dL  Auto Neutrophil # : x  Auto Lymphocyte # : x  Auto Monocyte # : x  Auto Eosinophil # : x  Auto Basophil # : x  Auto Neutrophil % : x  Auto Lymphocyte % : x  Auto Monocyte % : x  Auto Eosinophil % : x  Auto Basophil % : x        08-07    138  |  110<H>  |  10  ----------------------------<  99  3.2<L>   |  19<L>  |  0.41<L>    Ca    7.8<L>      07 Aug 2017 05:18  Phos  2.0     08-07  Mg     1.5     08-07

## 2017-08-07 NOTE — PROGRESS NOTE ADULT - ASSESSMENT
60 y/o F PMHx significant for recently diagnosed Ovarian cancer who was referred to the ED by her Oncologist for further evaluation of dehydration and diarrhea..

## 2017-08-07 NOTE — DISCHARGE NOTE ADULT - MEDICATION SUMMARY - MEDICATIONS TO CHANGE
I will SWITCH the dose or number of times a day I take the medications listed below when I get home from the hospital:    CoQ10 300 mg oral capsule  -- 1 cap(s) by mouth once a day    multivitamin  --    Acidophilus oral capsule  -- 1 cap(s) by mouth once a day    cholecalciferol 1000 intl units oral capsule  -- 1 cap(s) by mouth once a day

## 2017-08-07 NOTE — DISCHARGE NOTE ADULT - CARE PROVIDER_API CALL
Damaris Read), Hematology; Internal Medicine; Medical Oncology  [address] [place] [address] [id number]  Phone: [phone]  Fax: [phone]

## 2017-08-07 NOTE — DISCHARGE NOTE ADULT - PLAN OF CARE
- dehydration free - hydrated in the hospital  - most like induced by chemotherapy   - C. diff negative - d/c isolation precautions.   - loperamide PRN for diarrhea  - cont. anti-emetics prn  - cont. pain management prn  - heme/onc input appreciated - f/u .  - follow up with Dr. Read as an outpatient on wednesday - Anemia.  Plan: - anemia of chronic dz / chemo induces  - H/H 8.7. - repleted - Stage III S/P 2 cycles of chemotherapy - maintain good po intake

## 2017-08-07 NOTE — PROGRESS NOTE ADULT - PROBLEM SELECTOR PLAN 1
responding to chemo  for GYN/Onc evaluation after discharge at [facility] with Dr Vasquez  next cycles of chemo to be given as weekly smalller doses.

## 2017-08-07 NOTE — PROGRESS NOTE ADULT - SUBJECTIVE AND OBJECTIVE BOX
CHIEF COMPLAINT: diarrhea    62 y/o F PMHx significant for recently diagnosed Ovarian cancer who was referred to the ED by her Oncologist for further evaluation of dehydration and diarrhea. The patient last treatment with chemotherapy was last Friday. The patient notes that she has had persistent watery diarrhea associated w/ intractable nausea and vomiting since. The patient denies any recent sick contacts, or associated subjective fevers.     SUBJECTIVE: Patient seen and examined at bedside this AM. Feeling much better. Denies any abdominal pain or nausea. States that frequency of diarrhea has decreased.    REVIEW OF SYSTEMS:    CONSTITUTIONAL: No weakness, fevers or chills  EYES/ENT: No visual changes;  No vertigo or throat pain   NECK: No pain or stiffness  RESPIRATORY: No cough, wheezing, hemoptysis; No shortness of breath  CARDIOVASCULAR: No chest pain or palpitations  GASTROINTESTINAL: No abdominal or epigastric pain. No nausea, vomiting, or hematemesis; No diarrhea or constipation. No melena or hematochezia.  GENITOURINARY: No dysuria, frequency or hematuria  NEUROLOGICAL: No numbness or weakness  SKIN: No itching, burning, rashes, or lesions   All other review of systems is negative unless indicated above    Vital Signs Last 24 Hrs  T(C): 36.5 (07 Aug 2017 12:52), Max: 37.1 (06 Aug 2017 20:16)  T(F): 97.7 (07 Aug 2017 12:52), Max: 98.8 (06 Aug 2017 20:16)  HR: 73 (07 Aug 2017 12:52) (73 - 89)  BP: 116/53 (07 Aug 2017 12:52) (104/60 - 116/53)  BP(mean): --  RR: 17 (07 Aug 2017 12:52) (17 - 18)  SpO2: 99% (07 Aug 2017 12:52) (98% - 100%)    I&O's Summary    06 Aug 2017 07:01  -  07 Aug 2017 07:00  --------------------------------------------------------  IN: 980 mL / OUT: 0 mL / NET: 980 mL    07 Aug 2017 07:01  -  07 Aug 2017 13:07  --------------------------------------------------------  IN: 240 mL / OUT: 0 mL / NET: 240 mL        CAPILLARY BLOOD GLUCOSE          PHYSICAL EXAM:    Constitutional: NAD, awake and alert, well-developed  HEENT: PERR, EOMI, Normal Hearing, MMM  Neck: Soft and supple, No LAD, No JVD  Respiratory: Breath sounds are clear bilaterally, No wheezing, rales or rhonchi  Cardiovascular: S1 and S2, regular rate and rhythm, no Murmurs, gallops or rubs  Gastrointestinal: Bowel Sounds present, soft, nontender, nondistended, no guarding, no rebound  Extremities: No peripheral edema  Vascular: 2+ peripheral pulses  Neurological: A/O x 3, no focal deficits  Musculoskeletal: 5/5 strength b/l upper and lower extremities  Skin: No rashes    MEDICATIONS:  MEDICATIONS  (STANDING):  enoxaparin Injectable 40 milliGRAM(s) SubCutaneous every 24 hours  sodium chloride 0.45% with potassium chloride 20 mEq/L 1000 milliLiter(s) (100 mL/Hr) IV Continuous <Continuous>      LABS: All Labs Reviewed:                        8.7    2.2   )-----------( 232      ( 07 Aug 2017 05:18 )             25.2     08-07    138  |  110<H>  |  10  ----------------------------<  99  3.2<L>   |  19<L>  |  0.41<L>    Ca    7.8<L>      07 Aug 2017 05:18  Phos  2.0     08-07  Mg     1.5     08-07        C. DIFF NEGATIVE    Blood Culture: NGTD    RADIOLOGY/EKG:    DVT PPX: Lovenox 40 SQ    ADVANCED DIRECTIVE:    DISPOSITION:

## 2017-08-07 NOTE — PROGRESS NOTE ADULT - PROBLEM SELECTOR PLAN 2
- likely due to diarrhea and vomiting  - k: 3.2 repleted today  - continue 1/2 NS with KCl - likely due to diarrhea and vomiting  - k: 3.2 replete Mg and Phos to help with hypokalemia  - continue 1/2 NS with KCl

## 2017-08-07 NOTE — DISCHARGE NOTE ADULT - MEDICATION SUMMARY - MEDICATIONS TO TAKE
I will START or STAY ON the medications listed below when I get home from the hospital:    loperamide 2 mg oral tablet  -- 1 tab(s) by mouth 2 times a day, As Needed -for dizziness -for diarrhea  -- It is very important that you take or use this exactly as directed.  Do not skip doses or discontinue unless directed by your doctor.  May cause drowsiness.  Alcohol may intensify this effect.  Use care when operating dangerous machinery.  Obtain medical advice before taking any non-prescription drugs as some may affect the action of this medication.    -- Indication: For Diarrhea    CoQ10 300 mg oral capsule  -- 1 cap(s) by mouth once a day  -- Indication: For -    multivitamin  --     -- Indication: For -    cholecalciferol 1000 intl units oral capsule  -- 1 cap(s) by mouth once a day  -- Indication: For -

## 2017-08-10 DIAGNOSIS — E86.0 DEHYDRATION: ICD-10-CM

## 2017-08-10 DIAGNOSIS — T45.1X5A ADVERSE EFFECT OF ANTINEOPLASTIC AND IMMUNOSUPPRESSIVE DRUGS, INITIAL ENCOUNTER: ICD-10-CM

## 2017-08-10 DIAGNOSIS — K52.1 TOXIC GASTROENTERITIS AND COLITIS: ICD-10-CM
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
## 2017-08-10 DIAGNOSIS — C56.9 MALIGNANT NEOPLASM OF UNSPECIFIED OVARY: ICD-10-CM

## 2017-08-10 LAB
CULTURE RESULTS: SIGNIFICANT CHANGE UP
CULTURE RESULTS: SIGNIFICANT CHANGE UP
SPECIMEN SOURCE: SIGNIFICANT CHANGE UP
SPECIMEN SOURCE: SIGNIFICANT CHANGE UP

## 2017-08-11 DIAGNOSIS — E43 UNSPECIFIED SEVERE PROTEIN-CALORIE MALNUTRITION: ICD-10-CM

## 2017-08-11 DIAGNOSIS — D70.1 AGRANULOCYTOSIS SECONDARY TO CANCER CHEMOTHERAPY: ICD-10-CM

## 2017-08-11 DIAGNOSIS — E83.39 OTHER DISORDERS OF PHOSPHORUS METABOLISM: ICD-10-CM

## 2017-08-11 DIAGNOSIS — D63.8 ANEMIA IN OTHER CHRONIC DISEASES CLASSIFIED ELSEWHERE: ICD-10-CM

## 2017-08-26 LAB
CULTURE RESULTS: SIGNIFICANT CHANGE UP
SPECIMEN SOURCE: SIGNIFICANT CHANGE UP

## 2017-09-13 ENCOUNTER — APPOINTMENT (OUTPATIENT)
Dept: CT IMAGING | Facility: CLINIC | Age: 62
End: 2017-09-13

## 2017-09-26 ENCOUNTER — INPATIENT (INPATIENT)
Facility: HOSPITAL | Age: 62
LOS: 0 days | Discharge: HOME | End: 2017-09-27
Attending: INTERNAL MEDICINE
Payer: COMMERCIAL

## 2017-09-26 VITALS
RESPIRATION RATE: 17 BRPM | OXYGEN SATURATION: 100 % | TEMPERATURE: 98 F | SYSTOLIC BLOOD PRESSURE: 114 MMHG | DIASTOLIC BLOOD PRESSURE: 54 MMHG | HEART RATE: 92 BPM

## 2017-09-26 DIAGNOSIS — Z90.89 ACQUIRED ABSENCE OF OTHER ORGANS: Chronic | ICD-10-CM

## 2017-09-26 LAB
ACANTHOCYTES BLD QL SMEAR: SLIGHT — SIGNIFICANT CHANGE UP
ALBUMIN SERPL ELPH-MCNC: 2.9 G/DL — LOW (ref 3.3–5)
ALP SERPL-CCNC: 149 U/L — HIGH (ref 40–120)
ALT FLD-CCNC: 14 U/L — SIGNIFICANT CHANGE UP (ref 12–78)
ANION GAP SERPL CALC-SCNC: 7 MMOL/L — SIGNIFICANT CHANGE UP (ref 5–17)
ANISOCYTOSIS BLD QL: SLIGHT — SIGNIFICANT CHANGE UP
APPEARANCE UR: CLEAR — SIGNIFICANT CHANGE UP
APTT BLD: 24 SEC — LOW (ref 27.5–37.4)
AST SERPL-CCNC: 31 U/L — SIGNIFICANT CHANGE UP (ref 15–37)
BACTERIA # UR AUTO: (no result)
BASO STIPL BLD QL SMEAR: SLIGHT — SIGNIFICANT CHANGE UP
BILIRUB SERPL-MCNC: 1 MG/DL — SIGNIFICANT CHANGE UP (ref 0.2–1.2)
BILIRUB UR-MCNC: NEGATIVE — SIGNIFICANT CHANGE UP
BLD GP AB SCN SERPL QL: SIGNIFICANT CHANGE UP
BUN SERPL-MCNC: 14 MG/DL — SIGNIFICANT CHANGE UP (ref 7–23)
CALCIUM SERPL-MCNC: 8.6 MG/DL — SIGNIFICANT CHANGE UP (ref 8.5–10.1)
CHLORIDE SERPL-SCNC: 98 MMOL/L — SIGNIFICANT CHANGE UP (ref 96–108)
CO2 SERPL-SCNC: 26 MMOL/L — SIGNIFICANT CHANGE UP (ref 22–31)
COLOR SPEC: YELLOW — SIGNIFICANT CHANGE UP
CREAT SERPL-MCNC: 0.66 MG/DL — SIGNIFICANT CHANGE UP (ref 0.5–1.3)
DACRYOCYTES BLD QL SMEAR: SLIGHT — SIGNIFICANT CHANGE UP
DIFF PNL FLD: NEGATIVE — SIGNIFICANT CHANGE UP
EPI CELLS # UR: SIGNIFICANT CHANGE UP
GLUCOSE SERPL-MCNC: 130 MG/DL — HIGH (ref 70–99)
GLUCOSE UR QL: NEGATIVE MG/DL — SIGNIFICANT CHANGE UP
HCT VFR BLD CALC: 15.9 % — CRITICAL LOW (ref 34.5–45)
HGB BLD-MCNC: 5.2 G/DL — CRITICAL LOW (ref 11.5–15.5)
HYPOCHROMIA BLD QL: SIGNIFICANT CHANGE UP
INR BLD: 1.14 RATIO — SIGNIFICANT CHANGE UP (ref 0.88–1.16)
KETONES UR-MCNC: NEGATIVE — SIGNIFICANT CHANGE UP
LACTATE SERPL-SCNC: 1.1 MMOL/L — SIGNIFICANT CHANGE UP (ref 0.7–2)
LEUKOCYTE ESTERASE UR-ACNC: (no result)
LYMPHOCYTES # BLD AUTO: 7.1 % — LOW (ref 13–44)
MANUAL DIF COMMENT BLD-IMP: SIGNIFICANT CHANGE UP
MCHC RBC-ENTMCNC: 31.8 PG — SIGNIFICANT CHANGE UP (ref 27–34)
MCHC RBC-ENTMCNC: 32.6 GM/DL — SIGNIFICANT CHANGE UP (ref 32–36)
MCV RBC AUTO: 97.5 FL — SIGNIFICANT CHANGE UP (ref 80–100)
MICROCYTES BLD QL: SLIGHT — SIGNIFICANT CHANGE UP
NEUTROPHILS NFR BLD AUTO: 78.3 % — HIGH (ref 43–77)
NITRITE UR-MCNC: NEGATIVE — SIGNIFICANT CHANGE UP
OVALOCYTES BLD QL SMEAR: SLIGHT — SIGNIFICANT CHANGE UP
PH UR: 6.5 — SIGNIFICANT CHANGE UP (ref 5–8)
PLAT MORPH BLD: NORMAL — SIGNIFICANT CHANGE UP
PLATELET # BLD AUTO: 482 K/UL — HIGH (ref 150–400)
POIKILOCYTOSIS BLD QL AUTO: SIGNIFICANT CHANGE UP
POLYCHROMASIA BLD QL SMEAR: SLIGHT — SIGNIFICANT CHANGE UP
POTASSIUM SERPL-MCNC: 3.7 MMOL/L — SIGNIFICANT CHANGE UP (ref 3.5–5.3)
POTASSIUM SERPL-SCNC: 3.7 MMOL/L — SIGNIFICANT CHANGE UP (ref 3.5–5.3)
PROT SERPL-MCNC: 7.3 GM/DL — SIGNIFICANT CHANGE UP (ref 6–8.3)
PROT UR-MCNC: NEGATIVE MG/DL — SIGNIFICANT CHANGE UP
PROTHROM AB SERPL-ACNC: 12.4 SEC — SIGNIFICANT CHANGE UP (ref 9.8–12.7)
RBC # BLD: 1.63 M/UL — LOW (ref 3.8–5.2)
RBC # FLD: 14.4 % — SIGNIFICANT CHANGE UP (ref 10.3–14.5)
RBC BLD AUTO: (no result)
RBC CASTS # UR COMP ASSIST: NEGATIVE /HPF — SIGNIFICANT CHANGE UP (ref 0–4)
SODIUM SERPL-SCNC: 131 MMOL/L — LOW (ref 135–145)
SP GR SPEC: 1.01 — SIGNIFICANT CHANGE UP (ref 1.01–1.02)
TYPE + AB SCN PNL BLD: SIGNIFICANT CHANGE UP
UROBILINOGEN FLD QL: NEGATIVE MG/DL — SIGNIFICANT CHANGE UP
WBC # BLD: 5.6 K/UL — SIGNIFICANT CHANGE UP (ref 3.8–10.5)
WBC # FLD AUTO: 5.6 K/UL — SIGNIFICANT CHANGE UP (ref 3.8–10.5)
WBC UR QL: SIGNIFICANT CHANGE UP

## 2017-09-26 PROCEDURE — 71020: CPT | Mod: 26

## 2017-09-26 PROCEDURE — 74177 CT ABD & PELVIS W/CONTRAST: CPT | Mod: 26

## 2017-09-26 PROCEDURE — 99285 EMERGENCY DEPT VISIT HI MDM: CPT

## 2017-09-26 PROCEDURE — 93010 ELECTROCARDIOGRAM REPORT: CPT

## 2017-09-26 PROCEDURE — 71260 CT THORAX DX C+: CPT | Mod: 26

## 2017-09-26 RX ORDER — PANTOPRAZOLE SODIUM 20 MG/1
40 TABLET, DELAYED RELEASE ORAL ONCE
Qty: 0 | Refills: 0 | Status: COMPLETED | OUTPATIENT
Start: 2017-09-26 | End: 2017-09-26

## 2017-09-26 RX ORDER — DOCUSATE SODIUM 100 MG
100 CAPSULE ORAL THREE TIMES A DAY
Qty: 0 | Refills: 0 | Status: DISCONTINUED | OUTPATIENT
Start: 2017-09-26 | End: 2017-09-27

## 2017-09-26 RX ORDER — ACETAMINOPHEN 500 MG
650 TABLET ORAL EVERY 6 HOURS
Qty: 0 | Refills: 0 | Status: DISCONTINUED | OUTPATIENT
Start: 2017-09-26 | End: 2017-09-27

## 2017-09-26 RX ORDER — SODIUM CHLORIDE 9 MG/ML
3 INJECTION INTRAMUSCULAR; INTRAVENOUS; SUBCUTANEOUS ONCE
Qty: 0 | Refills: 0 | Status: COMPLETED | OUTPATIENT
Start: 2017-09-26 | End: 2017-09-26

## 2017-09-26 RX ORDER — SODIUM CHLORIDE 9 MG/ML
500 INJECTION INTRAMUSCULAR; INTRAVENOUS; SUBCUTANEOUS
Qty: 0 | Refills: 0 | Status: COMPLETED | OUTPATIENT
Start: 2017-09-26 | End: 2017-09-26

## 2017-09-26 RX ORDER — ONDANSETRON 8 MG/1
4 TABLET, FILM COATED ORAL EVERY 6 HOURS
Qty: 0 | Refills: 0 | Status: DISCONTINUED | OUTPATIENT
Start: 2017-09-26 | End: 2017-09-27

## 2017-09-26 RX ORDER — SENNA PLUS 8.6 MG/1
2 TABLET ORAL AT BEDTIME
Qty: 0 | Refills: 0 | Status: DISCONTINUED | OUTPATIENT
Start: 2017-09-26 | End: 2017-09-27

## 2017-09-26 RX ADMIN — SODIUM CHLORIDE 3 MILLILITER(S): 9 INJECTION INTRAMUSCULAR; INTRAVENOUS; SUBCUTANEOUS at 16:30

## 2017-09-26 RX ADMIN — SODIUM CHLORIDE 2000 MILLILITER(S): 9 INJECTION INTRAMUSCULAR; INTRAVENOUS; SUBCUTANEOUS at 16:30

## 2017-09-26 RX ADMIN — PANTOPRAZOLE SODIUM 40 MILLIGRAM(S): 20 TABLET, DELAYED RELEASE ORAL at 23:45

## 2017-09-26 RX ADMIN — SODIUM CHLORIDE 2000 MILLILITER(S): 9 INJECTION INTRAMUSCULAR; INTRAVENOUS; SUBCUTANEOUS at 17:00

## 2017-09-26 RX ADMIN — SODIUM CHLORIDE 2000 MILLILITER(S): 9 INJECTION INTRAMUSCULAR; INTRAVENOUS; SUBCUTANEOUS at 17:15

## 2017-09-26 RX ADMIN — SODIUM CHLORIDE 2000 MILLILITER(S): 9 INJECTION INTRAMUSCULAR; INTRAVENOUS; SUBCUTANEOUS at 16:45

## 2017-09-26 NOTE — ED PROVIDER NOTE - MEDICAL DECISION MAKING DETAILS
Pt with ovarian CA with recent increasing SOB, weakness and severe anemia. Plan to transfuse and admit.

## 2017-09-26 NOTE — ED PROVIDER NOTE - OBJECTIVE STATEMENT
63 y/o F with PMHx of ovarian CA, last chemo was Sept. 8th sent to the ED by Dr. Boston for a hemoglobin level of 4.2. +fatigue and weakness for the past few days. Denies melena, N/V, decreased appetite, abd pain or fever. Pt never received transfusions. Pt has received 3 rounds of chemo. Non smoker. No alcohol or drug use. Dr. Boston, onco. 61 y/o F with PMHx of ovarian CA, last chemo was Sept. 8th sent to the ED by Dr. Read for a hemoglobin level of 4.2. +fatigue and weakness for the past few days. Denies melena, N/V, decreased appetite, abd pain or fever. Pt never received transfusions. Pt has received 3 rounds of chemo. Non smoker. No alcohol or drug use. Dr. Read, onco.

## 2017-09-26 NOTE — ED PROVIDER NOTE - CRITICAL CARE PROVIDED
additional history taking/interpretation of diagnostic studies/consultation with other physicians/direct patient care (not related to procedure)

## 2017-09-26 NOTE — ED STATDOCS - PROGRESS NOTE DETAILS
[facility] 61 y/o F w/ pmhx of ovarian cancer, presents to ED c/o abnormal lab result of low Hemoglobin at 4.2. States she has felt weak and fatigued for last few days. Denies fevers, chills, has no other complaints. NKDA. Non-smoker, non-drinker. Pt will be sent to Main ED for further evaluation for blood transfusion and anemia workup.

## 2017-09-26 NOTE — H&P ADULT - NSHPPHYSICALEXAM_GEN_ALL_CORE
Vital Signs Last 24 Hrs  T(C): 37 (26 Sep 2017 18:01), Max: 37.2 (26 Sep 2017 17:46)  T(F): 98.6 (26 Sep 2017 18:01), Max: 98.9 (26 Sep 2017 17:46)  HR: 100 (26 Sep 2017 20:00) (90 - 101)  BP: 116/59 (26 Sep 2017 20:00) (105/60 - 128/66)  RR: 16 (26 Sep 2017 20:00) (14 - 18)  SpO2: 98% (26 Sep 2017 20:00) (96% - 100%)

## 2017-09-26 NOTE — H&P ADULT - ASSESSMENT
63 y/o F PMHx significant for recently diagnosed Ovarian cancer ( positive, CD 20 negative) last chemo was on 9/8, and a recent admission for a partial SBO treated conservatively presents to the ED today w/ c/o symptoms of dyspnea and generalized weakness/malaise over the past few days. The patient was referred to the ED for further evaluation of abnormal outpatient labs => Hgb was reportedly 4.2mg/dL by her Oncologist. The patient denies any associated symptoms of chest pain, palpitations, dizziness, nausea, vomiting, BRBPR, melena, hematemesis or CGE. Per ED documentation the patient was found to be GUAIAC (-). 61 y/o F PMHx significant for recently diagnosed Ovarian cancer ( positive, CD 20 negative) last chemo was on 9/8, and a recent admission for a partial SBO treated conservatively presents to the ED today w/ c/o symptoms of dyspnea and generalized weakness/malaise over the past few days. The patient was referred to the ED for further evaluation of abnormal outpatient labs => Hgb was reportedly 4.2mg/dL by her Oncologist. The patient denies any associated symptoms of chest pain, palpitations, dizziness, nausea, vomiting, BRBPR, melena, hematemesis or CGE. Per ED documentation the patient was found to be GUAIAC (-).     1)Symptomatic Anemia  ~admit to Medicine  ~f/u w/ Oncology in the am  ~transfuse prn  ~serial Hct/Hgb  ~f/u w/ GI in the am  ~given PPI    2)Vte ppx  ~cont. SCDs for now 63 y/o F PMHx significant for recently diagnosed Ovarian cancer ( positive, CD 20 negative) last chemo was on 9/8, and a recent admission for a partial SBO treated conservatively presents to the ED today w/ c/o symptoms of dyspnea and generalized weakness/malaise over the past few days. The patient was referred to the ED for further evaluation of abnormal outpatient labs => Hgb was reportedly 4.2mg/dL by her Oncologist. The patient denies any associated symptoms of chest pain, palpitations, dizziness, nausea, vomiting, BRBPR, melena, hematemesis or CGE. Per ED documentation the patient was found to be GUAIAC (-).     1)Symptomatic Anemia  ~admit to Medicine  ~f/u w/ Oncology in the am  ~transfuse prn  ~serial Hct/Hgb  ~f/u w/ GI in the am  ~given PPI    2)Ovarian Ca  ~f/u w/ Hematology/Oncology  ~last chemo was on 9/6    3)Vte ppx  ~cont. SCDs for now

## 2017-09-26 NOTE — H&P ADULT - HISTORY OF PRESENT ILLNESS
63 y/o F PMHx significant for recently diagnosed Ovarian cancer ( positive, CD 20 negative) last chemo was on 9/8, and a recent admission for a partial SBO treated conservatively presents to the ED today w/ c/o symptoms of dyspnea and generalized weakness/malaise over the past few days. The patient was referred to the ED for further evaluation of abnormal outpatient labs => Hgb was reportedly 4.2mg/dL by her Oncologist. The patient denies any associated symptoms of chest pain, palpitations, dizziness, nausea, vomiting, BRBPR, melena, hematemesis or CGE. Per ED documentation the patient was found to be GUAIAC (-). 63 y/o F PMHx significant for recently diagnosed Ovarian cancer ( positive, CD 20 negative) last chemo was on 9/6 (Taxo/Carbo), and a recent admission for a partial SBO treated conservatively presents to the ED today w/ c/o symptoms of dyspnea and generalized weakness/malaise over the past few days. The patient was referred to the ED for further evaluation of abnormal outpatient labs => Hgb/Hct was reportedly 5.2/15.8 by her Oncologist. The patient denies any associated symptoms of chest pain, palpitations, dizziness, nausea, vomiting, BRBPR, melena, hematemesis or CGE. Per ED documentation the patient was found to be GUAIAC (-).

## 2017-09-26 NOTE — ED PROVIDER NOTE - MUSCULOSKELETAL, MLM
Spine appears normal, range of motion is not limited, no muscle or joint tenderness. B/L lower extremities with mild edema.

## 2017-09-26 NOTE — ED PROVIDER NOTE - CONSTITUTIONAL, MLM
normal... Thin appearing. Awake, alert, oriented to person, place, time/situation and in no apparent distress.

## 2017-09-27 ENCOUNTER — TRANSCRIPTION ENCOUNTER (OUTPATIENT)
Age: 62
End: 2017-09-27

## 2017-09-27 VITALS
DIASTOLIC BLOOD PRESSURE: 56 MMHG | RESPIRATION RATE: 16 BRPM | OXYGEN SATURATION: 99 % | SYSTOLIC BLOOD PRESSURE: 115 MMHG | HEART RATE: 100 BPM | TEMPERATURE: 98 F

## 2017-09-27 LAB
ALBUMIN SERPL ELPH-MCNC: 2.8 G/DL — LOW (ref 3.3–5)
ALP SERPL-CCNC: 135 U/L — HIGH (ref 40–120)
ALT FLD-CCNC: 15 U/L — SIGNIFICANT CHANGE UP (ref 12–78)
ANION GAP SERPL CALC-SCNC: 6 MMOL/L — SIGNIFICANT CHANGE UP (ref 5–17)
AST SERPL-CCNC: 25 U/L — SIGNIFICANT CHANGE UP (ref 15–37)
BASOPHILS # BLD AUTO: 0 K/UL — SIGNIFICANT CHANGE UP (ref 0–0.2)
BASOPHILS NFR BLD AUTO: 0.7 % — SIGNIFICANT CHANGE UP (ref 0–2)
BILIRUB SERPL-MCNC: 2.1 MG/DL — HIGH (ref 0.2–1.2)
BUN SERPL-MCNC: 10 MG/DL — SIGNIFICANT CHANGE UP (ref 7–23)
CALCIUM SERPL-MCNC: 8.5 MG/DL — SIGNIFICANT CHANGE UP (ref 8.5–10.1)
CHLORIDE SERPL-SCNC: 101 MMOL/L — SIGNIFICANT CHANGE UP (ref 96–108)
CO2 SERPL-SCNC: 27 MMOL/L — SIGNIFICANT CHANGE UP (ref 22–31)
CREAT SERPL-MCNC: 0.68 MG/DL — SIGNIFICANT CHANGE UP (ref 0.5–1.3)
CULTURE RESULTS: NO GROWTH — SIGNIFICANT CHANGE UP
EOSINOPHIL # BLD AUTO: 0 K/UL — SIGNIFICANT CHANGE UP (ref 0–0.5)
EOSINOPHIL NFR BLD AUTO: 0.6 % — SIGNIFICANT CHANGE UP (ref 0–6)
GLUCOSE SERPL-MCNC: 100 MG/DL — HIGH (ref 70–99)
HCT VFR BLD CALC: 20.5 % — CRITICAL LOW (ref 34.5–45)
HCT VFR BLD CALC: 27.1 % — LOW (ref 34.5–45)
HGB BLD-MCNC: 6.9 G/DL — CRITICAL LOW (ref 11.5–15.5)
HGB BLD-MCNC: 9.2 G/DL — LOW (ref 11.5–15.5)
LYMPHOCYTES # BLD AUTO: 0.6 K/UL — LOW (ref 1–3.3)
LYMPHOCYTES # BLD AUTO: 9.7 % — LOW (ref 13–44)
MAGNESIUM SERPL-MCNC: 2 MG/DL — SIGNIFICANT CHANGE UP (ref 1.6–2.6)
MCHC RBC-ENTMCNC: 31.1 PG — SIGNIFICANT CHANGE UP (ref 27–34)
MCHC RBC-ENTMCNC: 32.1 PG — SIGNIFICANT CHANGE UP (ref 27–34)
MCHC RBC-ENTMCNC: 33.6 GM/DL — SIGNIFICANT CHANGE UP (ref 32–36)
MCHC RBC-ENTMCNC: 34 GM/DL — SIGNIFICANT CHANGE UP (ref 32–36)
MCV RBC AUTO: 92.8 FL — SIGNIFICANT CHANGE UP (ref 80–100)
MCV RBC AUTO: 94.4 FL — SIGNIFICANT CHANGE UP (ref 80–100)
MONOCYTES # BLD AUTO: 0.6 K/UL — SIGNIFICANT CHANGE UP (ref 0–0.9)
MONOCYTES NFR BLD AUTO: 9.9 % — SIGNIFICANT CHANGE UP (ref 2–14)
NEUTROPHILS # BLD AUTO: 5.2 K/UL — SIGNIFICANT CHANGE UP (ref 1.8–7.4)
NEUTROPHILS NFR BLD AUTO: 79.2 % — HIGH (ref 43–77)
PLATELET # BLD AUTO: 397 K/UL — SIGNIFICANT CHANGE UP (ref 150–400)
PLATELET # BLD AUTO: 493 K/UL — HIGH (ref 150–400)
POTASSIUM SERPL-MCNC: 4.1 MMOL/L — SIGNIFICANT CHANGE UP (ref 3.5–5.3)
POTASSIUM SERPL-SCNC: 4.1 MMOL/L — SIGNIFICANT CHANGE UP (ref 3.5–5.3)
PROT SERPL-MCNC: 6.8 GM/DL — SIGNIFICANT CHANGE UP (ref 6–8.3)
RBC # BLD: 2.21 M/UL — LOW (ref 3.8–5.2)
RBC # BLD: 2.87 M/UL — LOW (ref 3.8–5.2)
RBC # FLD: 14.3 % — SIGNIFICANT CHANGE UP (ref 10.3–14.5)
RBC # FLD: 15.3 % — HIGH (ref 10.3–14.5)
SODIUM SERPL-SCNC: 134 MMOL/L — LOW (ref 135–145)
SPECIMEN SOURCE: SIGNIFICANT CHANGE UP
WBC # BLD: 5.1 K/UL — SIGNIFICANT CHANGE UP (ref 3.8–10.5)
WBC # BLD: 6.6 K/UL — SIGNIFICANT CHANGE UP (ref 3.8–10.5)
WBC # FLD AUTO: 5.1 K/UL — SIGNIFICANT CHANGE UP (ref 3.8–10.5)
WBC # FLD AUTO: 6.6 K/UL — SIGNIFICANT CHANGE UP (ref 3.8–10.5)

## 2017-09-27 NOTE — CONSULT NOTE ADULT - ASSESSMENT
Patient with advanced ovarian cancer  S/P chemotherapy x 3 cycles  Clinically better  Decreased  ( outpatient)  Anemia: ? Chemotherapy related or bleed into tumor    A/P    Patient will see GYN ONC as out patient  If stable DC home  FU CBC out patient

## 2017-09-27 NOTE — DISCHARGE NOTE ADULT - HOSPITAL COURSE
hpi: 62y female w/ ovarian cancer s/p chemo 9/6 w/ taxo/carbo and recent partial sbo treated conservatively.  Sent in by Heme/onc w/ hg 5.2.    FOBT in ED (see provider note) negative.  9/27- feeling well, s/p 3 u prbc.  Denies ever having cp, sob, palp, abd pain, weakness, dizziness, no  blood in stool or brbpr.  Eager to go home.    ros- as above  Vital Signs Last 24 Hrs  T(C): 36.7 (27 Sep 2017 11:35), Max: 37.9 (26 Sep 2017 22:44)  T(F): 98.1 (27 Sep 2017 11:35), Max: 100.3 (26 Sep 2017 22:44)  HR: 100 (27 Sep 2017 11:35) (90 - 106)  BP: 115/56 (27 Sep 2017 11:35) (105/60 - 128/66)  BP(mean): --  RR: 16 (27 Sep 2017 11:35) (14 - 18)  SpO2: 99% (27 Sep 2017 11:35) (95% - 100%)        LABS: All Labs Reviewed:                        9.2    6.6   )-----------( 493      ( 27 Sep 2017 08:29 )             27.1     09-27    134<L>  |  101  |  10  ----------------------------<  100<H>  4.1   |  27  |  0.68    Ca    8.5      27 Sep 2017 08:29  Mg     2.0     09-27    TPro  6.8  /  Alb  2.8<L>  /  TBili  2.1<H>  /  DBili  x   /  AST  25  /  ALT  15  /  AlkPhos  135<H>  09-27    PT/INR - ( 26 Sep 2017 16:18 )   PT: 12.4 sec;   INR: 1.14 ratio         PTT - ( 26 Sep 2017 16:18 )  PTT:24.0 sec      PHYSICAL EXAM:  General: NAD, comfortable, thin, chronic ill appearing female  Neuro: AAOx3, no focal deficits  HEENT: alopecia, eomi  Neck: Soft and supple, No JVD  Respiratory: CTA b/l, no w/r/r  Cardiovascular: S1 and S2, RRR, no m/g/r  Gastrointestinal: +BS, soft, NTND, no rebound/guarding  Extremities: No c/c/e  Vascular: 2+ peripheral pulses  Musculoskeletal: 5/5 strength b/l UE and LE, sensation intact  Skin: No rashes    imaging reviewed  ASSESSMENT and PLAN:    *anemia either due to chemo or acute bleed into tumor   - discussed w/ Heme/Onc- agree to d/c home and follow up in office Friday  FOBT negative  h/h improved s/p 3units prbc  asymptomatic  normocytic anemia  - pt will have labs Friday in office, had iron studies outpt    *fever  - yesterday w/ transfusion, resolved  - normal UA, no infiltrate on CXR  imaging w/ masses consistent w/ neoplasm and since 7/26 carcinomatosis increased size  - liver, GB and ducts all within normal limits  - pt will follow up at [place] w/ her Gyn/Onc for her Ovarian cancer

## 2017-09-27 NOTE — DIETITIAN INITIAL EVALUATION ADULT. - ORAL INTAKE PTA
good/Pt reports appetite is improving. Pt had poor appetite and lost 20#, after treatments for fluid imbalance. but pt has gained back about 10#.

## 2017-09-27 NOTE — DISCHARGE NOTE ADULT - CARE PLAN
Principal Discharge DX:	Anemia  Goal:	close follow up with Dr. Read on Friday- repeat labs (cbc, cmp (direct and indirect bilirubin), iron studies as needed)  Instructions for follow-up, activity and diet:	Anemia approved w/ 3units of packed red blood cells.  Follow up with Dr. Read in office on Friday for repeat labs. Follow up with your Gyn/Onc at [facility].  If you develop shortness of breath, dizziness, weakness, blood in stool or dark stool, fevers- please return to hospital.

## 2017-09-27 NOTE — DISCHARGE NOTE ADULT - CARE PROVIDER_API CALL
Damaris Read), Hematology; Internal Medicine; Medical Oncology  [address] [place] [address]  Phone: [phone]  Fax: [phone]

## 2017-09-27 NOTE — DIETITIAN INITIAL EVALUATION ADULT. - OTHER INFO
Consult for wt loss. Pt denies chewing and swallowing difficulty. Pt denies n/v/d/c. Pt reports NKFA. Pt states she has lost weight since june, but has gained back 10 #. Pt presents with very clear signs of muscle and fat wasting suggesting significant loss of body mass. Pt drinks ensure at home, encourage to increase intake Po with calorically dense foods or supplements. Pt agreeable.

## 2017-09-27 NOTE — DIETITIAN INITIAL EVALUATION ADULT. - PHYSICAL APPEARANCE
other (specify)/emaciated/Pt with severe muscle wasting in clavicle, deltoid. Pt with moderate wasting in temples, interossous, calfs and quads. Pt with severe fat wasting in ribs and moderate fat wasting in triceps and occular

## 2017-09-27 NOTE — DISCHARGE NOTE ADULT - MEDICATION SUMMARY - MEDICATIONS TO TAKE
I will START or STAY ON the medications listed below when I get home from the hospital:    curcumin  -- tab(s) by mouth once a day  -- Indication: For home med    Beta-Glucan  -- tab(s) by mouth once a day  -- Indication: For home med    CoQ10 300 mg oral capsule  -- 1 cap(s) by mouth once a day  -- Indication: For home med    Multiple Vitamins oral tablet  -- 1 tab(s) by mouth once a day  -- Indication: For home med    cholecalciferol 2000 intl units oral capsule  -- 1 cap(s) by mouth once a day  -- Indication: For home med

## 2017-09-27 NOTE — DISCHARGE NOTE ADULT - PATIENT PORTAL LINK FT
“You can access the Follow[employer] Patient Portal, offered by [facility], by registering with the following website: [email or web address]”

## 2017-09-27 NOTE — CHART NOTE - NSCHARTNOTEFT_GEN_A_CORE
Upon Nutritional Assessment by the Registered Dietitian your patient was determined to meet criteria has evidence of the following diagnosis/diagnoses:        [ ]  Mild Protein Calorie Malnutrition        [ ]  Moderate Protein Calorie Malnutrition        [x] Severe Protein Calorie Malnutrition        [ ] Unspecified Protein Calorie Malnutrition    Findings as based on:  •  Comprehensive nutrition assessment and Nutrition focused physical examination  •  Insufficient food/energy intake  •  Weight loss over time(Please specify time period)  •  Loss of muscle mass  •  Loss of fat mass  •  Fluid accumulation    Findings relevant to patient:  1. Significant weight loss (8% of Bw over 3 months)  2. Severe muscle wasting  3 Severe fat wasting    Nutrition Interventions:  1. ensure enlive TID  2.  3.    PROVIDER Section:     By signing this assessment you are acknowledging and agree with the diagnosis/diagnoses assigned by the Registered Dietitian    Comments:

## 2017-09-27 NOTE — CONSULT NOTE ADULT - SUBJECTIVE AND OBJECTIVE BOX
HPI:  63 y/o F PMHx significant for recently diagnosed Ovarian cancer ( positive, CD 20 negative) last chemo was on  (Taxo/Carbo), and a recent admission for a partial SBO treated conservatively presents to the ED today w/ c/o symptoms of dyspnea and generalized weakness/malaise over the past few days. The patient was referred to the ED for further evaluation of abnormal outpatient labs => Hgb/Hct was reportedly 5.2/15.8 by her Oncologist. The patient denies any associated symptoms of chest pain, palpitations, dizziness, nausea, vomiting, BRBPR, melena, hematemesis or CGE. Per ED documentation the patient was found to be GUAIAC (-). (26 Sep 2017 20:39)  She has been transfused and feels much improved; guaiac is negative    Since the chemotherapy her abdominal discomforts have improved significantly; Her appetite has picked up, she has gained weight      PAST MEDICAL & SURGICAL HISTORY:  SBO (small bowel obstruction)/ managed clinically  Ovarian cancer/ omental biopsy  History of tonsillectomy      MEDICATIONS  (STANDING):    MEDICATIONS  (PRN):  acetaminophen   Tablet 650 milliGRAM(s) Oral every 6 hours PRN For Temp greater than 38 C (100.4 F)  acetaminophen   Tablet. 650 milliGRAM(s) Oral every 6 hours PRN Mild Pain (1 - 3)  ondansetron Injectable 4 milliGRAM(s) IV Push every 6 hours PRN Nausea  senna 2 Tablet(s) Oral at bedtime PRN Constipation  docusate sodium 100 milliGRAM(s) Oral three times a day PRN Constipation      Allergies    No Known Allergies    FAMILY HISTORY:  Family history of esophageal cancer (Mother)  Family history of Parkinson disease (Father)  Family history of prostate cancer (Father)  Family history of hypertension (Father)      Vital Signs Last 24 Hrs  T(C): 37.5 (27 Sep 2017 06:25), Max: 37.9 (26 Sep 2017 22:44)  T(F): 99.5 (27 Sep 2017 06:25), Max: 100.3 (26 Sep 2017 22:44)  HR: 94 (27 Sep 2017 06:25) (90 - 106)  BP: 113/64 (27 Sep 2017 06:25) (105/60 - 128/66)  BP(mean): --  RR: 16 (27 Sep 2017 06:25) (14 - 18)  SpO2: 95% (27 Sep 2017 06:25) (95% - 100%)    PHYSICAL EXAM:      Constitutional: Appears comfortable, in  NAD, A/O X 3     Eyes: EOMI, PERRLA ;No icterus    ENMT:  No oral lesions, thrush; pharynx not injected    Neck:  Supple; No masses, lymph nodes, no bruits    Breasts: NA    Back: No tenderness     Respiratory:  Clear to A/P, No wheezes, rhonchi, rales. No dullness to percussion    Cardiovascular: Normal rate and rhythm; normal S1 and S2; No murmurs. No gallop    Gastrointestinal: sl distension, normal bowl sounds; No  tenderness , guarding, rebound;  no masses, no hepatosplenimegaly, no fluid wave    Genitourinary: No flank pains, no inguninal adenopathy    Rectal: NA    Extremities:  No phlebitis, no edema    Vascular: No acrocyanosis, no edema    Neurological: Alert and oriented. Cranial nerves II-XII WNL, Upper extremity / lower extremity  strength WNL;  Reflexes WNL, Plantar downgoing ; No cerebellar signs    Skin: No rash, petichae, purpura, echymoses    Lymph Nodes: No cervical, supraclavicular, axillary, inguinal adenopathy    Musculoskeletal: No joint swelling    Psychiatric: Alert, oriented, normal affect      LABS:                        9.2    6.6   )-----------( 493      ( 27 Sep 2017 08:29 )             27.1         134<L>  |  101  |  10  ----------------------------<  100<H>  4.1   |  27  |  0.68    Ca    8.5      27 Sep 2017 08:29  Mg     2.0         TPro  6.8  /  Alb  2.8<L>  /  TBili  2.1<H>  /  DBili  x   /  AST  25  /  ALT  15  /  AlkPhos  135<H>      PT/INR - ( 26 Sep 2017 16:18 )   PT: 12.4 sec;   INR: 1.14 ratio         PTT - ( 26 Sep 2017 16:18 )  PTT:24.0 sec  Urinalysis Basic - ( 26 Sep 2017 18:01 )    Color: Yellow / Appearance: Clear / S.010 / pH: x  Gluc: x / Ketone: Negative  / Bili: Negative / Urobili: Negative mg/dL   Blood: x / Protein: Negative mg/dL / Nitrite: Negative   Leuk Esterase: Trace / RBC: Negative /H< from: CT Abdomen and Pelvis w/ IV Cont (17 @ 18:43) >  EXAM:  CT ABDOMEN AND PELVIS IC                            < from: CT Abdomen and Pelvis w/ IV Cont (17 @ 18:43) >  EXAM:  CT ABDOMEN AND PELVIS IC                          EXAM:  CT CHEST IC                            PROCEDURE DATE:  2017          INTERPRETATION:  CLINICAL INFORMATION: Anemia, ovarian cancer.    COMPARISON: CT abdomen and pelvis from 2017 and CT chest from   2017.    PROCEDURE:   CT of the Chest, Abdomen and Pelvis was performed with intravenous   contrast.   Intravenous contrast: 90 mL Omnipaque 350. 10 mL discarded.  Oral contrast: positive contrast was administered.  Sagittal and coronal reformats were performed.    FINDINGS:    CHEST:     LUNGS AND LARGE AIRWAYS: Unchanged left lower lobe nodule (series 3,   image 40), measuring 0.8 cm. Tiny nodule in the posterior right lower   lobe, measuring 0.6 cm, not previously visualized.  PLEURA: No pleural effusion.  VESSELS: Within normal limits.   HEART: Heart size is normal. No pericardial effusion.  MEDIASTINUM AND LISA: No lymphadenopathy.  CHEST WALL AND LOWER NECK: Within normal limits.    ABDOMEN AND PELVIS:    LIVER: Within normal limits.  BILE DUCTS: Normal caliber.   GALLBLADDER: Within normal limits.  SPLEEN: Within normal limits.  PANCREAS: Within normal limits.  ADRENALS: Within normal limits.  KIDNEYS/URETERS: Subcentimeter lesion in the rightlower pole, too small   to characterize. Mild fullness of the both renal collecting systems.     BLADDER: Within normal limits.  REPRODUCTIVE ORGANS: Bilateral mixed cystic and solid masses, measuring   15.0 x 12.6 cm on the right and 6.3 x 5.5 cm onthe left.    BOWEL: No bowel obstruction. Colonic diverticulosis.   PERITONEUM: Peritoneal carcinomatosis, including a right pelvic implant   (series 2, image 145), measuring 4.1 x 3.2 cm. Increased size of a left   lower quadrant implant (series 2, image 131), measuring 2.2 x 1.9 cm,   previously 1.7 x 0.7 cm. Moderate volume abdominopelvic ascites.  VESSELS:  Within normal limits.  RETROPERITONEUM: No lymphadenopathy.    ABDOMINAL WALL: Umbilical node measuring 4.4 x 0.9 cm.  BONES: Degenerative changes of the spine.    IMPRESSION:  1.  Bilateral mixed cystic and solid masses, consistent with neoplasm.  2.  Since 2017, peroneal carcinomatosis with increased size of a   few peritoneal implants. Moderate volume abdominopelvic ascites.      < end of copied text >  < from: CT Chest w/ IV Cont (17 @ 18:43) >  EXAM:  CT ABDOMEN AND PELVIS IC                          EXAM:  CT CHEST IC                            PROCEDURE DATE:  2017          INTERPRETATION:  CLINICAL INFORMATION: Anemia, ovarian cancer.    COMPARISON: CT abdomen and pelvis from 2017 and CT chest from   2017.    PROCEDURE:   CT of the Chest, Abdomen and Pelvis was performed with intravenous   contrast.   Intravenous contrast: 90 mL Omnipaque 350. 10 mL discarded.  Oral contrast: positive contrast was administered.  Sagittal and coronal reformats were performed.    FINDINGS:    CHEST:     LUNGS AND LARGE AIRWAYS: Unchanged left lower lobe nodule (series 3,   image 40), measuring 0.8 cm. Tiny nodule in the posterior right lower   lobe, measuring 0.6 cm, not previously visualized.  PLEURA: No pleural effusion.  VESSELS: Within normal limits.   HEART: Heart size is normal. No pericardial effusion.  MEDIASTINUM AND LISA: No lymphadenopathy.  CHEST WALL AND LOWER NECK: Within normal limits.    ABDOMEN AND PELVIS:    LIVER: Within normal limits.  BILE DUCTS: Normal caliber.   GALLBLADDER: Within normal limits.  SPLEEN: Within normal limits.  PANCREAS: Within normal limits.  ADRENALS: Within normal limits.  KIDNEYS/URETERS: Subcentimeter lesion in the rightlower pole, too small   to characterize. Mild fullness of the both renal collecting systems.     BLADDER: Within normal limits.  REPRODUCTIVE ORGANS: Bilateral mixed cystic and solid masses, measuring   15.0 x 12.6 cm on the right and 6.3 x 5.5 cm onthe left.    BOWEL: No bowel obstruction. Colonic diverticulosis.   PERITONEUM: Peritoneal carcinomatosis, including a right pelvic implant   (series 2, image 145), measuring 4.1 x 3.2 cm. Increased size of a left   lower quadrant implant (series 2, image 131), measuring 2.2 x 1.9 cm,   previously 1.7 x 0.7 cm. Moderate volume abdominopelvic ascites.  VESSELS:  Within normal limits.  RETROPERITONEUM: No lymphadenopathy.    ABDOMINAL WALL: Umbilical node measuring 4.4 x 0.9 cm.  BONES: Degenerative changes of the spine.    IMPRESSION:  1.  Bilateral mixed cystic and solid masses, consistent with neoplasm.  2.  Since 2017, peroneal carcinomatosis with increased size of a   few peritoneal implants. Moderate volume abdominopelvic ascites.                BENJAMIN VARGAS   This document has been electronically signed. Sep 26 2017  7:34PM              < end of copied text >          R

## 2017-09-27 NOTE — DISCHARGE NOTE ADULT - PLAN OF CARE
close follow up with Dr. Read on Friday- repeat labs (cbc, cmp (direct and indirect bilirubin), iron studies as needed) Anemia approved w/ 3units of packed red blood cells.  Follow up with Dr. Read in office on Friday for repeat labs. Follow up with your Gyn/Onc at [place].  If you develop shortness of breath, dizziness, weakness, blood in stool or dark stool, fevers- please return to hospital.

## 2017-09-29 DIAGNOSIS — E43 UNSPECIFIED SEVERE PROTEIN-CALORIE MALNUTRITION: ICD-10-CM

## 2017-09-29 DIAGNOSIS — C56.1 MALIGNANT NEOPLASM OF RIGHT OVARY: ICD-10-CM

## 2017-09-29 DIAGNOSIS — D62 ACUTE POSTHEMORRHAGIC ANEMIA: ICD-10-CM

## 2017-09-29 DIAGNOSIS — D64.81 ANEMIA DUE TO ANTINEOPLASTIC CHEMOTHERAPY: ICD-10-CM

## 2017-09-29 DIAGNOSIS — Z92.21 PERSONAL HISTORY OF ANTINEOPLASTIC CHEMOTHERAPY: ICD-10-CM
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
## 2017-09-29 DIAGNOSIS — C56.2 MALIGNANT NEOPLASM OF LEFT OVARY: ICD-10-CM

## 2017-09-29 DIAGNOSIS — R53.1 WEAKNESS: ICD-10-CM

## 2017-09-29 DIAGNOSIS — T45.1X5A ADVERSE EFFECT OF ANTINEOPLASTIC AND IMMUNOSUPPRESSIVE DRUGS, INITIAL ENCOUNTER: ICD-10-CM

## 2017-10-05 ENCOUNTER — EMERGENCY (EMERGENCY)
Facility: HOSPITAL | Age: 62
LOS: 0 days | Discharge: HOME | End: 2017-10-05
Attending: EMERGENCY MEDICINE | Admitting: EMERGENCY MEDICINE
Payer: COMMERCIAL

## 2017-10-05 VITALS
OXYGEN SATURATION: 100 % | SYSTOLIC BLOOD PRESSURE: 123 MMHG | HEART RATE: 96 BPM | TEMPERATURE: 98 F | DIASTOLIC BLOOD PRESSURE: 64 MMHG | RESPIRATION RATE: 17 BRPM

## 2017-10-05 VITALS
HEIGHT: 67 IN | DIASTOLIC BLOOD PRESSURE: 60 MMHG | SYSTOLIC BLOOD PRESSURE: 110 MMHG | RESPIRATION RATE: 18 BRPM | OXYGEN SATURATION: 100 % | TEMPERATURE: 98 F | HEART RATE: 104 BPM | WEIGHT: 115.08 LBS

## 2017-10-05 DIAGNOSIS — C56.9 MALIGNANT NEOPLASM OF UNSPECIFIED OVARY: ICD-10-CM

## 2017-10-05 DIAGNOSIS — Z90.89 ACQUIRED ABSENCE OF OTHER ORGANS: Chronic | ICD-10-CM

## 2017-10-05 DIAGNOSIS — R10.9 UNSPECIFIED ABDOMINAL PAIN: ICD-10-CM

## 2017-10-05 DIAGNOSIS — R18.8 OTHER ASCITES: ICD-10-CM

## 2017-10-05 LAB
ALBUMIN SERPL ELPH-MCNC: 2.3 G/DL — LOW (ref 3.3–5)
ALP SERPL-CCNC: 212 U/L — HIGH (ref 40–120)
ALT FLD-CCNC: 16 U/L — SIGNIFICANT CHANGE UP (ref 12–78)
ANION GAP SERPL CALC-SCNC: 7 MMOL/L — SIGNIFICANT CHANGE UP (ref 5–17)
APTT BLD: 27.3 SEC — LOW (ref 27.5–37.4)
AST SERPL-CCNC: 26 U/L — SIGNIFICANT CHANGE UP (ref 15–37)
BASOPHILS # BLD AUTO: 0.1 K/UL — SIGNIFICANT CHANGE UP (ref 0–0.2)
BASOPHILS NFR BLD AUTO: 0.6 % — SIGNIFICANT CHANGE UP (ref 0–2)
BILIRUB SERPL-MCNC: 1.1 MG/DL — SIGNIFICANT CHANGE UP (ref 0.2–1.2)
BLD GP AB SCN SERPL QL: SIGNIFICANT CHANGE UP
BUN SERPL-MCNC: 14 MG/DL — SIGNIFICANT CHANGE UP (ref 7–23)
CALCIUM SERPL-MCNC: 8.6 MG/DL — SIGNIFICANT CHANGE UP (ref 8.5–10.1)
CHLORIDE SERPL-SCNC: 96 MMOL/L — SIGNIFICANT CHANGE UP (ref 96–108)
CO2 SERPL-SCNC: 27 MMOL/L — SIGNIFICANT CHANGE UP (ref 22–31)
CREAT SERPL-MCNC: 0.61 MG/DL — SIGNIFICANT CHANGE UP (ref 0.5–1.3)
EOSINOPHIL # BLD AUTO: 0 K/UL — SIGNIFICANT CHANGE UP (ref 0–0.5)
EOSINOPHIL NFR BLD AUTO: 0.4 % — SIGNIFICANT CHANGE UP (ref 0–6)
GLUCOSE SERPL-MCNC: 99 MG/DL — SIGNIFICANT CHANGE UP (ref 70–99)
GRAM STN FLD: SIGNIFICANT CHANGE UP
HCT VFR BLD CALC: 22.9 % — LOW (ref 34.5–45)
HGB BLD-MCNC: 7.4 G/DL — LOW (ref 11.5–15.5)
INR BLD: 1.33 RATIO — HIGH (ref 0.88–1.16)
LYMPHOCYTES # BLD AUTO: 0.6 K/UL — LOW (ref 1–3.3)
LYMPHOCYTES # BLD AUTO: 5.5 % — LOW (ref 13–44)
MCHC RBC-ENTMCNC: 30.2 PG — SIGNIFICANT CHANGE UP (ref 27–34)
MCHC RBC-ENTMCNC: 32.3 GM/DL — SIGNIFICANT CHANGE UP (ref 32–36)
MCV RBC AUTO: 93.4 FL — SIGNIFICANT CHANGE UP (ref 80–100)
MONOCYTES # BLD AUTO: 0.8 K/UL — SIGNIFICANT CHANGE UP (ref 0–0.9)
MONOCYTES NFR BLD AUTO: 8 % — SIGNIFICANT CHANGE UP (ref 2–14)
NEUTROPHILS # BLD AUTO: 9 K/UL — HIGH (ref 1.8–7.4)
NEUTROPHILS NFR BLD AUTO: 85.5 % — HIGH (ref 43–77)
PLATELET # BLD AUTO: 582 K/UL — HIGH (ref 150–400)
POTASSIUM SERPL-MCNC: 3.7 MMOL/L — SIGNIFICANT CHANGE UP (ref 3.5–5.3)
POTASSIUM SERPL-SCNC: 3.7 MMOL/L — SIGNIFICANT CHANGE UP (ref 3.5–5.3)
PROT SERPL-MCNC: 7.1 GM/DL — SIGNIFICANT CHANGE UP (ref 6–8.3)
PROTHROM AB SERPL-ACNC: 14.4 SEC — HIGH (ref 9.8–12.7)
RBC # BLD: 2.46 M/UL — LOW (ref 3.8–5.2)
RBC # FLD: 13.9 % — SIGNIFICANT CHANGE UP (ref 10.3–14.5)
SODIUM SERPL-SCNC: 130 MMOL/L — LOW (ref 135–145)
SPECIMEN SOURCE: SIGNIFICANT CHANGE UP
TYPE + AB SCN PNL BLD: SIGNIFICANT CHANGE UP
WBC # BLD: 10.5 K/UL — SIGNIFICANT CHANGE UP (ref 3.8–10.5)
WBC # FLD AUTO: 10.5 K/UL — SIGNIFICANT CHANGE UP (ref 3.8–10.5)

## 2017-10-05 PROCEDURE — 49083 ABD PARACENTESIS W/IMAGING: CPT

## 2017-10-05 PROCEDURE — 99285 EMERGENCY DEPT VISIT HI MDM: CPT

## 2017-10-05 RX ORDER — SODIUM CHLORIDE 9 MG/ML
1000 INJECTION INTRAMUSCULAR; INTRAVENOUS; SUBCUTANEOUS ONCE
Qty: 0 | Refills: 0 | Status: COMPLETED | OUTPATIENT
Start: 2017-10-05 | End: 2017-10-05

## 2017-10-05 RX ADMIN — SODIUM CHLORIDE 1000 MILLILITER(S): 9 INJECTION INTRAMUSCULAR; INTRAVENOUS; SUBCUTANEOUS at 15:39

## 2017-10-05 NOTE — ED STATDOCS - NS_ ATTENDINGSCRIBEDETAILS _ED_A_ED_FT
I, Carol Ann Alcazar MD, performed the initial face to face bedside interview with this patient regarding history of present illness and determined that the patient should be seen in the main ED.  The history, was documented by the scribe in my presence and I attest to the accuracy of the documentation.

## 2017-10-05 NOTE — ED PROVIDER NOTE - CONSTITUTIONAL, MLM
normal... Pallor appearing, well nourished, awake, alert, oriented to person, place, time/situation and in no apparent distress.

## 2017-10-05 NOTE — ED ADULT NURSE REASSESSMENT NOTE - NS ED NURSE REASSESS COMMENT FT1
patient discharged home. written and verbal discharge and followup instructions given to patient, patient verbalized back understanding. iv taken out. patient denies pain or discomfort at time of discharge. additional verbal instructions given to patient. patient discharged home with .

## 2017-10-05 NOTE — ED PROVIDER NOTE - PROGRESS NOTE DETAILS
patient currently feels well s/p paracentesis. appropriate for discharge home. precautions when to return to the ED given and understood.

## 2017-10-05 NOTE — ED STATDOCS - PROGRESS NOTE DETAILS
Davide Cabezas ([facility]) for Dr. Alcazra: 61 yo female PMH ovarian cancer (dx in June, recently finished chemo), presents for abdominal ascites.  States the fluid has been drained twice in the past.  +SOB, decreased PO intake, constipation.  Pt is scheduled for hysterectomy on Tuesday.  Oncologist Dr. Read.  Will triage patient to Main for further evaluation.

## 2017-10-05 NOTE — ED ADULT TRIAGE NOTE - CHIEF COMPLAINT QUOTE
Pt. c/o of abdominal pain from fluid in her abdomen. Pt. sent by MD office. Pt. has ovarian cancer. Pt. c/o of no BM for two days and pain during inspiration and difficulty breathing.

## 2017-10-05 NOTE — BRIEF OPERATIVE NOTE - PROCEDURE
<<-----Click on this checkbox to enter Procedure Paracentesis with ultrasound guidance by physician  10/05/2017    Active  COLT

## 2017-10-05 NOTE — ED STATDOCS - MEDICAL DECISION MAKING DETAILS
61 yo female with ovarian cancer, recently finished chemo, with abdominal ascites.  Will triage patient to Main for further evaluation.

## 2017-10-05 NOTE — ED PROVIDER NOTE - OBJECTIVE STATEMENT
61 y/o f with hx of ovarian cancer on chemo with Dr Osorio and scheduled hysterectomy for this coming Tuesday presents to the ED c/o abd pain. + Loss of appetite, + unable to have a bowl movement +SOB. x2 paracentesis.

## 2017-10-05 NOTE — ED ADULT NURSE NOTE - OBJECTIVE STATEMENT
Pt c/o abdominal pain. Hx ovarian CA- plan for hysterectomy Tuesday. P/w worsening abdominal pain- states previously received a paracentesis to relief.

## 2017-10-06 ENCOUNTER — OUTPATIENT (OUTPATIENT)
Dept: OUTPATIENT SERVICES | Facility: HOSPITAL | Age: 62
LOS: 1 days | Discharge: HOME | End: 2017-10-06

## 2017-10-06 DIAGNOSIS — Z90.89 ACQUIRED ABSENCE OF OTHER ORGANS: Chronic | ICD-10-CM

## 2017-10-06 DIAGNOSIS — C56.9 MALIGNANT NEOPLASM OF UNSPECIFIED OVARY: ICD-10-CM

## 2017-10-06 LAB
B PERT IGG+IGM PNL SER: (no result)
BLD GP AB SCN SERPL QL: SIGNIFICANT CHANGE UP
COLOR FLD: SIGNIFICANT CHANGE UP
EOSINOPHIL # FLD: 1 % — SIGNIFICANT CHANGE UP
FLUID INTAKE SUBSTANCE CLASS: SIGNIFICANT CHANGE UP
FLUID SEGMENTED GRANULOCYTES: 50 % — SIGNIFICANT CHANGE UP
LYMPHOCYTES # FLD: 14 % — SIGNIFICANT CHANGE UP
MESOTHL CELL # FLD: 2 % — SIGNIFICANT CHANGE UP
MONOS+MACROS # FLD: 25 % — SIGNIFICANT CHANGE UP
OTHER CELLS FLD MANUAL: 8 % — SIGNIFICANT CHANGE UP
RCV VOL RI: HIGH /UL (ref 0–5)
SPECIMEN SOURCE FLD: SIGNIFICANT CHANGE UP
TOTAL NUCLEATED CELL COUNT, BODY FLUID: 1440 /UL — HIGH (ref 0–5)
TUBE TYPE: SIGNIFICANT CHANGE UP
TYPE + AB SCN PNL BLD: SIGNIFICANT CHANGE UP

## 2017-10-07 VITALS
RESPIRATION RATE: 16 BRPM | HEART RATE: 100 BPM | DIASTOLIC BLOOD PRESSURE: 52 MMHG | SYSTOLIC BLOOD PRESSURE: 112 MMHG | OXYGEN SATURATION: 100 % | TEMPERATURE: 98 F

## 2017-10-07 VITALS
TEMPERATURE: 98 F | OXYGEN SATURATION: 99 % | RESPIRATION RATE: 16 BRPM | DIASTOLIC BLOOD PRESSURE: 53 MMHG | SYSTOLIC BLOOD PRESSURE: 103 MMHG | HEART RATE: 94 BPM

## 2017-10-07 RX ORDER — DIPHENHYDRAMINE HCL 50 MG
25 CAPSULE ORAL ONCE
Qty: 0 | Refills: 0 | Status: COMPLETED | OUTPATIENT
Start: 2017-10-07 | End: 2017-10-07

## 2017-10-07 RX ORDER — ACETAMINOPHEN 500 MG
650 TABLET ORAL ONCE
Qty: 0 | Refills: 0 | Status: COMPLETED | OUTPATIENT
Start: 2017-10-07 | End: 2017-10-07

## 2017-10-07 RX ADMIN — Medication 650 MILLIGRAM(S): at 08:59

## 2017-10-07 RX ADMIN — Medication 25 MILLIGRAM(S): at 08:58

## 2017-10-10 LAB
CULTURE RESULTS: SIGNIFICANT CHANGE UP
SPECIMEN SOURCE: SIGNIFICANT CHANGE UP

## 2017-10-19 LAB
COMMENT - FLUIDS: SIGNIFICANT CHANGE UP

## 2017-11-15 RX ORDER — MILK THISTLE 180 MG
0 CAPSULE ORAL
Qty: 0 | Refills: 0 | COMMUNITY

## 2017-11-15 RX ORDER — LACTOBACILLUS ACIDOPHILUS 100MM CELL
1 CAPSULE ORAL
Qty: 0 | Refills: 0 | COMMUNITY

## 2017-11-15 RX ORDER — UBIDECARENONE 100 MG
1 CAPSULE ORAL
Qty: 0 | Refills: 0 | COMMUNITY

## 2017-11-15 RX ORDER — CHOLECALCIFEROL (VITAMIN D3) 125 MCG
1 CAPSULE ORAL
Qty: 0 | Refills: 0 | COMMUNITY

## 2018-04-17 NOTE — ED PROVIDER NOTE - DISPOSITION TYPE
Asked to see for abdominal pain     Long history of chronic abdominal pain    Post op ileostomy reversal 4/11    Up and about in [address]    To be discharged today    He requested 10 mg percocet 2 tabs every 6 hours and I aggreed to 1 tablet every 6 hours for 5 days    Surgical team wrote for percocet 5 mg #36 which is 10 mg every 4 hours    He is upset that he is not receiving 5 days of pain med howerver, this is 5 days if he takes them every 6 ours as planned    No change in discharge med   I will see prn    History of polysubstance abuse     Problem list as noted below     A  Post op pain   Opioid tolerant  Hx of opioid abuse    Drug seeking behavior     Given his current ambulation, I question hos 10/10 pain     p        D/w pt     Up in all  ambulating without pain behavior   Looks comfortable   No pain behavior     Visit Vitals  /81 (BP Location: RUE, Patient Position: Semi-Ibarra's)   Pulse 108   Temp 99 °F (37.2 °C) (Oral)   Resp 18   Ht 5' 7\" (1.702 m)   Wt 147 lb (66.7 kg)   SpO2 99%   BMI 23.02 kg/m²       Patient Active Problem List   Diagnosis   • Benign localized hyperplasia of prostate with urinary obstruction and other lower urinary tract symptoms (LUTS)(600.21)   • Tourette disorder   • Allergic rhinitis, cause unspecified   • Drug abuse   • 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 Ileostomy done 12/29/18   • Renal infarct (CMS/[facility]) 01/2018   • Tourette disorder 2006     Past Surgical History:   Procedure Laterality Date   • Colonoscopy  03/20/2018    Unprepped. Solid stool in colon, unable to do exam   • Colonoscopy  04/02/2018     Adherent stool at the jejunocolonic anastomosis / Semi solid stool rectum / No obvious contraindication to proceed with ileostomy takedown.   • Cystourethroscopy w one or more biopsy  09/23/2013    Cysto BX/  Fibrosis and chronic inflammation     • Drain abd abscess percutaneous  12/25/2017    Drain exchange 12/28/18.    • Exploratory laparotomy w/ bowel resection  12/20/2017    For small bowel perforation from very large retroperitoneal desmoid tumor, with jejunocolic anastomosis   • Ileostomy  12/29/2017    Diverting loop ileostomy   • Ileostomy closure  04/12/2018    ileostomy reversal with small bowel resection   • Ir drain exchange  01/04/2018    Also 1/11/18L;Abdominal drain   • Reopen recent abd exploratory  12/29/2017    Oversew anastomotic leak; severe feculent/enteric peritonitis with multiple loculated abscesses   • Thoracentesis Left 01/04/2018     ALLERGIES:  No Known Allergies  Social History     Social History   • Marital status: Single     Spouse name: N/A   • Number of children: 0   • Years of education: N/A     Occupational History   • Not on file.     Social History Main Topics   • Smoking status: Current Some Day Smoker     Packs/day: 0.50     Years: 7.00     Types: Cigarettes     Start date: 1/1/2011   • Smokeless tobacco: Never Used   • Alcohol use No   • Drug use: No   • Sexual activity: Not on file     Other Topics Concern   •  Service No   • Blood Transfusions No    • Caffeine Concern Not Asked     coffee1-2  per day at most   • Occupational Exposure Not Asked     ssi-- tourettes;  graduated HS.--special ed, but not sure of learning disability   • Stress Concern Not Asked     raised by grandfather   • Exercise Not Asked     walking, jogging, push ups.   • Seat Belt Yes     Social History Narrative   • No narrative on file     Current Facility-Administered Medications   Medication   • sodium hypochlorite (DAKIN'S) 0.062 % (1/8 strength) irrigation solution   • gabapentin (NEURONTIN) capsule 600 mg   • lidocaine (XYLOCAINE) 4 % topical solution 1 application   • oxyCODONE (IMM REL) (ROXICODONE) tablet 15 mg   • potassium chloride (K-DUR,KLOR-CON) CR tablet 20 mEq   • potassium chloride (KLOR-CON) packet 20 mEq   • potassium chloride (K-DUR,KLOR-CON) CR tablet 40 mEq   • potassium chloride (KLOR-CON) packet 40 mEq   • sodium chloride 0.9 % flush bag 500 mL   • ALPRAZolam (XANAX) tablet 1 mg   • acetaminophen (TYLENOL) tablet 650 mg   • tamsulosin (FLOMAX) capsule 0.4 mg   • sodium chloride 0.9% infusion   • sodium chloride 0.9 % flush bag 500 mL   • magnesium sulfate 2 g in 50 mL premix IVPB   • magnesium sulfate 2 g in 50 mL premix IVPB   • magnesium sulfate 1 g in dextrose 5% 100 mL IVPB premix   • dextrose 5 % infusion   • lactated ringers infusion   • heparin (porcine) injection 5,000 Units   • ondansetron (ZOFRAN) injection 4 mg   • dextrose 5 % / lactated ringers infusion        ADMIT

## 2018-05-04 NOTE — ED PROVIDER NOTE - PROGRESS NOTE DETAILS
Alert-The patient is alert, awake and responds to voice. The patient is oriented to time, place, and person. The triage nurse is able to obtain subjective information.
Left message with hospitalist    I, Harper Anderson, attest that this documentation has been prepared under the direction and in the presence of Dr. Pitt

## 2018-09-30 NOTE — ED PROVIDER NOTE - DATE/TIME 2
Tae Moore), Otolaryngology  [address]  [address] [place] [address]  Phone: [phone]  Fax: [phone]    Dallin Hinton), Otolaryngology  [address] [place] [address]  Phone: [phone]  Fax: [phone] 26-Jul-2017 19:35
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
## 2019-12-18 NOTE — ED ADULT TRIAGE NOTE - BANDS:
[FreeTextEntry1] : I reviewed the findings with the patient and her . Treatment options for the prolapse were discussed and included no intervention, Kegel exercises and behavioral modification, a pessary, or surgical correction. Surgically we discussed the abdominal vs the vaginal routes. Abdominally we discussed a hysterectomy and a sacral colpopexy laparoscopically and robotically. Vaginally we discussed a vaginal hysterectomy with reconstruction vs closure. Surgically we discussed hysteropexy as well as the use of biologics.\par \par Urine dipstick showed trace blood. Will send for urinalysis.\par \par Based on the counseling, she is interested in surgery. Will obtain urodynamics and a pelvic ultrasound. IUGA handout on POP given to patient. We will further discuss treatment options after her evaluation is completed. All questions answered.
Fall Risk;

## 2020-08-26 NOTE — PROGRESS NOTE ADULT - PROBLEM/PLAN-1
DISPLAY PLAN FREE TEXT
Quality 265: Biopsy Follow-Up: Biopsy results reviewed, communicated, tracked, and documented
Detail Level: Detailed

## 2021-02-26 NOTE — CHART NOTE - NSCHARTNOTEFT_GEN_A_CORE
Upon Nutritional Assessment by the Registered Dietitian your patient was determined to meet criteria has evidence of the following diagnosis/diagnoses:        [ ]  Mild Protein Calorie Malnutrition        [ ]  Moderate Protein Calorie Malnutrition        [x] Severe Protein Calorie Malnutrition        [ ] Unspecified Protein Calorie Malnutrition    Findings as based on:  •  Comprehensive nutrition assessment and Nutrition focused physical examination  •  Insufficient food/energy intake  •  Weight loss over time(Please specify time period)  •  Loss of muscle mass  •  Loss of fat mass  •  Fluid accumulation    Findings relevant to patient:  1. severe muscle wasting in scapula, clavicles, shoulders, acromion process, thighs .    2.severe fat depletion in orbital area, triceps, ribs  3 Edema in lower extremities.   4. Dx of cancer  5.  Wt loss and gain over past several months  6. Likely  PO intake < 75% needs > one month      Nutrition Interventions:  1. Suggest advance diet to clear liquid to full liquid to low fiber, when medically feasible and as tolerated  2. Suggest add Ensure Enlive 8 oz tid, when pt is advanced to full liquid diet  3. Suggest add MVI w/minerals      TO BE COMPLETED BY PROVIDER:          [ ] Agree:         [ ] Disagree:    Comments: No

## 2021-05-10 NOTE — H&P ADULT - NSHPPOADEEPVENOUSTHROMB_GEN_A_CORE
Assessment & Plan     Dysuria  Urine has improved. Will switch to cipro 250mg two times daily x 3 days. Push fluids. Return to clinic if symptoms worsen or do not improve; otherwise follow up as needed      - *UA reflex to Microscopic and Culture ([facility] (except [place] and [place])  - Urine Microscopic  - Urine Culture Aerobic Bacterial  - ciprofloxacin (CIPRO) 250 MG tablet; Take 1 tablet (250 mg) by mouth 2 times daily for 3 days                 Return in about 2 days (around 5/12/2021), or if symptoms worsen or fail to improve.    YELENA Zapata [facility] [place]    Subjective   Chief Complaint   Patient presents with     Urinary Problem     5/6/2021 Patient was seen for a uti feels a little better with left back pain, still having ua symptoms only able to go a little at a time. Patient is using a cream on her knees wonders if that is contributing to infections.       HPI     UTI    Onset of symptoms was 4day(s).  Course of illness is same  Severity moderate  Current and associated symptoms dysuria and frequency  Treatment and measures tried bactrim does not seem to be helping   Predisposing factors include none  Patient denies rigors, flank pain, temperature > 101 degrees F. and vomiting                    Review of Systems   Constitutional: Negative for chills, fatigue and fever.   HENT: Negative for congestion, ear pain, rhinorrhea, sinus pressure, sinus pain and sore throat.    Eyes: Negative for pain, discharge and redness.   Respiratory: Negative for cough, shortness of breath and wheezing.    Cardiovascular: Negative for palpitations.   Gastrointestinal: Negative for abdominal pain, constipation, diarrhea, heartburn, hematochezia, nausea and vomiting.   Genitourinary: Positive for dysuria and frequency.   Musculoskeletal: Negative for arthralgias, back pain and myalgias.   Skin: Negative for rash.            Objective    /78 (BP Location:  "Right arm, Patient Position: Sitting, Cuff Size: Adult Regular)   Pulse 79   Temp 97  F (36.1  C) (Tympanic)   Resp 16   Ht 1.594 m (5' 2.75\")   Wt 74.4 kg (164 lb)   SpO2 96%   BMI 29.28 kg/m    Body mass index is 29.28 kg/m .  Physical Exam  Constitutional:       General: She is not in acute distress.     Appearance: She is well-developed.   Abdominal:      Tenderness: There is no right CVA tenderness or left CVA tenderness.   Psychiatric:         Behavior: Behavior normal.            Results for orders placed or performed in visit on 05/10/21 (from the past 24 hour(s))   *UA reflex to Microscopic and Culture ([place] [facility] (except [place] and [place])    Specimen: Urine   Result Value Ref Range    Color Urine Yellow     Appearance Urine Cloudy     Glucose Urine Negative NEG^Negative mg/dL    Bilirubin Urine Negative NEG^Negative    Ketones Urine Negative NEG^Negative mg/dL    Specific Gravity Urine >1.030 1.003 - 1.035    Blood Urine Negative NEG^Negative    pH Urine 6.0 5.0 - 7.0 pH    Protein Albumin Urine Negative NEG^Negative mg/dL    Urobilinogen Urine 0.2 0.2 - 1.0 EU/dL    Nitrite Urine Negative NEG^Negative    Leukocyte Esterase Urine Small (A) NEG^Negative    Source Urine    Urine Microscopic   Result Value Ref Range    WBC Urine 0 - 5 OTO5^0 - 5 /HPF    RBC Urine O - 2 OTO2^O - 2 /HPF    Squamous Epithelial /LPF Urine Many (A) FEW^Few /LPF    Bacteria Urine Few (A) NEG^Negative /HPF               " no

## 2022-01-05 NOTE — H&P ADULT - GASTROINTESTINAL
"Anesthesia Pre-Procedure Evaluation    Patient: Luisa Steele   MRN:     [id number] Gender:   female   Age:    6 month old :      2019        Preoperative Diagnosis: VSD (ventricular septal defect and aortic arch hypoplasia [Q21.0, Q25.42]   Procedure(s):  COMPUTED TOMOGRAPHY of Chest @ 0800     LABS:  CBC:   Lab Results   Component Value Date    WBC 2021    HGB 2021    HGB 2021    HCT 2021     2021     BMP:   Lab Results   Component Value Date     2021     (L) 2021    POTASSIUM 2021    POTASSIUM 2021    CHLORIDE 100 2021    CHLORIDE 97 2021    CO2021    CO2021    BUN 9 2021    BUN 12 2021    CR 2021    CR 2021    GLC 74 2021    GLC 60 2021     COAGS: No results found for: PTT, INR, FIBR  POC:   Lab Results   Component Value Date    BGM 52 2021     OTHER:   Lab Results   Component Value Date    LACT 2.1 (H) 2021    KWAN 7.5 (L) 2021    BILITOTAL 12.3 (H) 2021        Preop Vitals    BP Readings from Last 3 Encounters:   22 101/63   21 74/50    Pulse Readings from Last 3 Encounters:   21 126      Resp Readings from Last 3 Encounters:   22 (!) 36   21 42    SpO2 Readings from Last 3 Encounters:   22 (!) 74%   21 92%      Temp Readings from Last 1 Encounters:   22 36.4  C (97.6  F) (Axillary)    Ht Readings from Last 1 Encounters:   22 0.685 m (2' 2.97\") (77 %, Z= 0.75)*     * Growth percentiles are based on WHO (Girls, 0-2 years) data.      Wt Readings from Last 1 Encounters:   22 8 kg (17 lb 10.2 oz) (69 %, Z= 0.50)*     * Growth percentiles are based on WHO (Girls, 0-2 years) data.    Estimated body mass index is 17.05 kg/m  as calculated from the following:    Height as of this encounter: 0.685 m (2' 2.97\").    Weight as of this encounter: 8 kg (17 " lb 10.2 oz).     LDA:        History reviewed. No pertinent past medical history.   History reviewed. No pertinent surgical history.   No Known Allergies     Anesthesia Evaluation    ROS/Med Hx    No history of anesthetic complications    Cardiovascular Findings   (+) congenital heart disease  Comments:   TTE 2021: Dextrocardia. Balanced, AV canal. Malposed great arteries with the aorta anterior and leftward to PA. Moderate inlet VSD with low velocity bidirectional flow. Common AV valve with mild right sided AVV (Mitral) regurgitation and mild left sided AVV (Tricuspid) regurgitation. Common atrium. Left SVC and hepatic venous confluence drain to systemic venous atrium. Interrupted IVC with azygous continuation to lSVC. Four pulmonary veins drain to right side of common atrium. Tiny, tortuous PDA with left to right shunt. Left aortic arch with normal branching pattern. Aortic arch appears normal. Normal RV and LV size and qualitatively normal systolic function. Moderate hypoplasia of bicuspid pulmonary valve. Subvalvar pulmonary stenosis. Peak combined pulmonic outflow tract gradient is 47 mmHg. Unobstructed branch PAs. Biventricular hypertrophy. No significant change from last echocardiogram.    Multiple cardiac anomalies  - Baseline sats low 70ies to 80ies  - Dextrocardia/Heterotaxy  - concern for coronary anomaly  - Balanced AV canal defect/VSD  - Common atrium  - Multiple venous abnormalities  - Pulmonary outflow tract obstruction (peak gradient 47 mmHg) with normal flow in PAs      Pulmonary Findings - negative ROS      Skin Findings   (+) rash (Facial Nevus, evaluated by Dermatalogy, not a Hemangioma, no concern for PHACE syndrome)     Findings   (-) prematurity (Gestational Age: 39w3d)      GI/Hepatic/Renal Findings - negative ROS    Endocrine/Metabolic Findings - negative ROS      Genetic/Syndrome Findings   (-) genetic syndrome (there was concern for PHACE syndrome, but ruled  out)    Hematology/Oncology Findings - negative hematology/oncology ROS            PHYSICAL EXAM:   Mental Status/Neuro: Age Appropriate; Anterior [place] Normal   Airway: Facies: Feasible  Mallampati: Not Assessed  Mouth/Opening: Not Assessed  TM distance: Normal (Peds)  Neck ROM: Full   Respiratory: Auscultation: CTAB     Resp. Rate: Age appropriate     Resp. Effort: Normal      CV: Rhythm: Regular  Rate: Age appropriate  Heart: Murmur (Systolic; Holo)  Edema: None   Comments:      Dental: Normal Dentition                Anesthesia Plan    ASA Status:  3   NPO Status:  NPO Appropriate    Anesthesia Type: General.     - Airway: Native airway   Induction: Intravenous.   Maintenance: TIVA.        Consents    Anesthesia Plan(s) and associated risks, benefits, and realistic alternatives discussed. Questions answered and patient/representative(s) expressed understanding.    - Discussed:     - Discussed with:  Parent (Mother and/or Father)      - Extended Intubation/Ventilatory Support Discussed: No.      - Patient is DNR/DNI Status: No    Use of blood products discussed: No .     Postoperative Care    Post procedure pain management: none anticipated.        Comments:    Other Comments: Discussed common and potentially harmful risks for General Anesthesia, Native Airway.   These risks include, but were not limited to: Conversion to secured airway, Sore throat, Airway injury, Dental injury, Aspiration, Respiratory issues (Bronchospasm, Laryngospasm, Desaturation), Hemodynamic issues (Arrhythmia, Hypotension, Ischemia), Potential long term consequences of respiratory and hemodynamic issues, PONV, Emergence delirium/agitation  Risks of invasive procedures were not discussed: N/A    All questions were answered.         Juanjo Scott MD   negative detailed exam

## 2022-03-31 NOTE — ED PROVIDER NOTE - DATE/TIME 1
[email or web address]
26-Sep-2017 18:03

## 2023-01-31 NOTE — ED STATDOCS - CPE ED [facility] NORM
I called and spoke with pt  Pt notified of results reviewed by Dr Maier and she agreed to follow up as planned   normal...

## 2023-02-10 NOTE — DIETITIAN INITIAL EVALUATION ADULT. - ORAL INTAKE PTA
"Chief Complaint: Well Woman Exam     HPI:      Nel Rothman is a 40 y.o.  who presents today for well woman exam.  New patient - was previously a patient of Dr. Berger at [facility].  LMP: Patient's last menstrual period was 2023.  Reports heavy periods lasting around one week.  Denies painful cramping.      No issues, problems, or complaints. Specifically, patient denies abnormal vaginal bleeding, discharge, pelvic pain, urinary problems, or changes in appetite. Ms. Rothman is currently sexually active with a single male partner. She is currently using bilateral tubal ligation for contraception. She would like STD screening today.    Previous Pap: Pt reports no abnormal Paps.    Family hx of breast, ovarian, or endometrial cx:  negative  Family hx of colon cx: negative  She is not a smoker.   She does not have a family history of VTE, early MI or strokes.   She does not have a h/o migraine with aura or VTE herself.    Ms. Rothman confirms that she wears her seatbelt when riding in the car and does text while driving.     OB History          3    Para   3    Term   3            AB        Living   3         SAB        IAB        Ectopic        Multiple        Live Births   3                 ROS:     GENERAL: Denies unintentional weight gain or weight loss. Feeling well overall.   SKIN: Denies rash or lesions.   HEENT: Denies headaches, or vision changes.   CARDIOVASCULAR: Denies palpitations or chest pain.   RESPIRATORY: Denies shortness of breath or dyspnea on exertion.  BREASTS: Denies lumps or nipple discharge.   ABDOMEN: Denies constipation, diarrhea, nausea, vomiting, change in appetite.  URINARY: Denies frequency, dysuria.  NEUROLOGIC: Denies syncope or weakness.   PSYCHIATRIC: Denies uncontrolled depression or anxiety.    Physical Exam:      PHYSICAL EXAM:  /88   Ht 5' 2" (1.575 m)   Wt 63.3 kg (139 lb 8.8 oz)   LMP 2023   BMI 25.52 kg/m²   Body mass index " is 25.52 kg/m².     APPEARANCE: Well nourished, well developed, in no acute distress.  PSYCH: Appropriate mood and affect.  SKIN: No acne or hirsutism  NECK: Neck symmetric without masses  NODES: No inguinal, axillary, or supraclavicular lymph node enlargement  ABDOMEN: Soft.  No tenderness or masses.    CARDIOVASCULAR: No edema of peripheral extremities  BREASTS: Symmetrical, no visible skin lesions. No palpable masses. No nipple discharge bilaterally.  PELVIC: Normal external genitalia without lesions.  Normal hair distribution.  Adequate perineal body, normal urethral meatus.  Vagina moist and well rugated. Without lesions. Vagina without discharge.  Cervix pink, without lesions, discharge or tenderness.  No significant cystocele or rectocele.  Bimanual exam shows uterus to be normal size, regular, mobile and nontender.  Adnexa without masses or tenderness.    Pap performed.    Assessment/Plan:     Encounter for well woman exam with routine gynecological exam       -     Counseled patient regarding healthy diet and regular exercise, daily multivitamin, daily seat belt use.        -      BP normotensive       -      She denies abuse and feels safe at home.       -      Pap smear:  Performed         -      STD screening:  Performed.         -      MMG:  Due for baseline.  Orders placed.       -      Colonoscopy:  Not due      Screening for cervical cancer  -     Pap Smear, Thin Prep    Screening for HPV (human papillomavirus)  -     HPV DNA, High Risk with Reflex to Genotype 16,18    Encounter for mammogram to establish baseline mammogram  -     Mammo Digital Screening Bilat w/ Kyle; Future; Expected date: 02/10/2023    Screening for STDs (sexually transmitted diseases)  -     VAGINOSIS SCREEN BY DNA PROBE  -     C. trachomatis/N. gonorrhoeae by AMP DNA [facility]; Cervicovaginal    Menorrhagia with irregular cycle  -     Discussed options for heavy periods and the noncontraceptive benefits of hormonal contraceptives  including:  menstrual cycle regularity, improvement of menorrhagia, improvement of dysmenorrhea, ability to induce amenorrhea for lifestyle considerations (wedding, swim meet, etc.), help PMS, prevent menstrual migraines, can decrease endometrial, ovarian, colorectal cancer risk, can improve acne/hirsutism..  -     At this time patient would like to try OCPs.  -     Patient does not have a major contraindication for the use of this birth control method.   -     After discussing the risks, benefits, and alternatives, Nel Rothman has opted to begin contraceptive treatment with oral contraceptive pills.   Today's discussion included:  Potential minor side effects such as breakthrough spotting, nausea, breast tenderness, weight changes, acne, headaches, etc.   Potential though less likely major side effects such as MI, stroke, and deep vein thrombosis. She has been asked to report any signs of such serious problems immediately.    The need for barrier contraception to prevent exposure to sexually transmitted diseases. Ms. Rothman was clearly counseled that OCP's cannot protect her against diseases such as HIV, herpes, and others.   -     norgestimate-ethinyl estradioL (ORTHO-CYCLEN) 0.25-35 mg-mcg per tablet; Take 1 tablet by mouth once daily.  Dispense: 28 tablet; Refill: 11      Counseling:     Patient was counseled today on current ASCCP pap guidelines, the recommendation for yearly physical exams, healthy diet and exercise routines, safe driving habits, and breast self awareness. She is to see her PCP for other health maintenance.     Follow up in about 1 year.     Use of the [facility] Patient Portal discussed and encouraged during today's visit.   Counseling time: 30 minutes    Delmis Nguyen (Maggie), JOS  Obstetrics and Gynecology  [facility]         poor

## 2023-09-13 NOTE — PATIENT PROFILE ADULT. - NS PRO LAST MENSTRUAL DATE
Left message with call back number regarding scheduling appt for pt and pt's wife   menopause/not applicable

## 2024-01-15 NOTE — INFUSION NURSING HISTORY - SUBSTANCE USE
Patient returned call and scheduled for 1/26/24 at Noon.    Tamar Serrano CP.   Population Health Clinical   [facility]  Toll free: [phone] Option 2     For Pharmacy Admin Tracking Only    Program: [facility]  CPA in place:  No  Recommendation Provided To: Patient/Caregiver: 1 via Telephone  Intervention Detail: Scheduled Appointment  Intervention Accepted By: Patient/Caregiver: 1  Gap Closed?: Yes   Time Spent (min): 10    never used

## 2024-03-27 NOTE — GOALS OF CARE CONVERSATION - PERSONAL ADVANCE DIRECTIVE - SURROGATE PHONE NUMBER
The ABCs of the Annual Wellness Visit  Subsequent Medicare Wellness Visit    Subjective      Radha Chamberlain is a 77 y.o. female who presents for a Subsequent Medicare Wellness Visit.    The following portions of the patient's history were reviewed and   updated as appropriate: allergies, current medications, past family history, past medical history, past social history, past surgical history, and problem list.    Compared to one year ago, the patient feels her physical   health is the same. She has been having more trouble with her arthritis.     Compared to one year ago, the patient feels her mental   health is better.    Recent Hospitalizations:  She was admitted within the past 365 days at [facility].       Current Medical Providers:  Patient Care Team:  Estela Angel DO as PCP - General (Family Medicine)    Outpatient Medications Prior to Visit   Medication Sig Dispense Refill    amLODIPine (NORVASC) 10 MG tablet Take 1 tablet by mouth Daily. 90 tablet 1    benazepril (LOTENSIN) 20 MG tablet Take 1 tablet by mouth Daily. 90 tablet 1    carvedilol (COREG) 25 MG tablet Take 1 tablet by mouth 2 (Two) Times a Day With Meals. 180 tablet 1    furosemide (LASIX) 20 MG tablet Take 1 tablet by mouth Daily. 90 tablet 1    glimepiride (AMARYL) 4 MG tablet Take 1 tablet by mouth Every Morning Before Breakfast. 90 tablet 0    ibuprofen (ADVIL,MOTRIN) 800 MG tablet Take 1 tablet by mouth Every 8 (Eight) Hours As Needed for Mild Pain. 60 tablet 0    lovastatin (MEVACOR) 20 MG tablet Take 1 tablet by mouth Every Night. 90 tablet 1    metFORMIN (GLUCOPHAGE) 1000 MG tablet Take 1 tablet by mouth 2 (Two) Times a Day With Meals. 180 tablet 1    pioglitazone (ACTOS) 15 MG tablet Take 1 tablet by mouth Daily. 90 tablet 1    potassium chloride (KLOR-CON) 10 MEQ CR tablet Take 1 tablet by mouth 2 (Two) Times a Day. 180 tablet 1    rOPINIRole (REQUIP) 2 MG tablet Take 1 tablet by mouth Every  "Night. 90 tablet 1     No facility-administered medications prior to visit.       No opioid medication identified on active medication list. I have reviewed chart for other potential  high risk medication/s and harmful drug interactions in the elderly.        Aspirin is not on active medication list.  Aspirin use is not indicated based on review of current medical condition/s. Risk of harm outweighs potential benefits.  .    Patient Active Problem List   Diagnosis    Type 2 diabetes mellitus with hyperglycemia, without long-term current use of insulin    Mixed hyperlipidemia    Essential hypertension     Advance Care Planning   Advance Care Planning     Advance Directive is not on file.  ACP discussion was held with the patient during this visit. Patient does not have an advance directive, information provided. Patient does not have an advance directive, declines further assistance.     Objective    Vitals:    24 0855   BP: 126/72   Pulse: 85   SpO2: 100%   Weight: 99.8 kg (220 lb)   Height: 157.5 cm (62\")     Estimated body mass index is 40.24 kg/m² as calculated from the following:    Height as of this encounter: 157.5 cm (62\").    Weight as of this encounter: 99.8 kg (220 lb).           Does the patient have evidence of cognitive impairment?   No    Lab Results   Component Value Date    CHLPL 155 03/15/2024    TRIG 152 (H) 03/15/2024    HDL 45 03/15/2024    LDL 84 03/15/2024    VLDL 26 03/15/2024    HGBA1C 7.9 (H) 03/15/2024          HEALTH RISK ASSESSMENT    Smoking Status:  Social History     Tobacco Use   Smoking Status Former    Current packs/day: 0.00    Average packs/day: 1 pack/day for 33.1 years (33.1 ttl pk-yrs)    Types: Cigarettes    Start date:     Quit date: 1992    Years since quittin.1   Smokeless Tobacco Never     Alcohol Consumption:  Social History     Substance and Sexual Activity   Alcohol Use Yes    Comment: rare     Fall Risk Screen:    DAVID Fall Risk Assessment was " completed, and patient is at HIGH risk for falls. Assessment completed on:3/27/2024    Depression Screening:      3/27/2024     8:59 AM   PHQ-2/PHQ-9 Depression Screening   Little Interest or Pleasure in Doing Things 0-->not at all   Feeling Down, Depressed or Hopeless 0-->not at all   PHQ-9: Brief Depression Severity Measure Score 0       Health Habits and Functional and Cognitive Screening:      3/27/2024     8:59 AM   Functional & Cognitive Status   Do you have difficulty preparing food and eating? No   Do you have difficulty bathing yourself, getting dressed or grooming yourself? No   Do you have difficulty using the toilet? No   Do you have difficulty moving around from place to place? No   Do you have trouble with steps or getting out of a bed or a chair? No   Current Diet Well Balanced Diet   Dental Exam Not up to date   Eye Exam Up to date   Exercise (times per week) 2 times per week   Current Exercises Include Aerobics   Do you need help using the phone?  No   Are you deaf or do you have serious difficulty hearing?  No   Do you need help to go to places out of walking distance? No   Do you need help shopping? No   Do you need help preparing meals?  No   Do you need help with housework?  No   Do you need help with laundry? No   Do you need help taking your medications? No   Do you need help managing money? No   Do you ever drive or ride in a car without wearing a seat belt? No   Have you felt unusual stress, anger or loneliness in the last month? No   Who do you live with? Spouse   If you need help, do you have trouble finding someone available to you? No   Have you been bothered in the last four weeks by sexual problems? No   Do you have difficulty concentrating, remembering or making decisions? No       Age-appropriate Screening Schedule:  Refer to the list below for future screening recommendations based on patient's age, sex and/or medical conditions. Orders for these recommended tests are listed in the  plan section. The patient has been provided with a written plan.    Health Maintenance   Topic Date Due    URINE MICROALBUMIN  Never done    DXA SCAN  Never done    COLORECTAL CANCER SCREENING  06/01/2024    ZOSTER VACCINE (1 of 2) 03/27/2024 (Originally 11/6/1998)    COVID-19 Vaccine (1 - 2023-24 season) 03/29/2024 (Originally 9/1/2023)    INFLUENZA VACCINE  03/31/2024 (Originally 8/1/2023)    RSV Vaccine - Adults (1 - 1-dose 60+ series) 03/27/2025 (Originally 11/6/2008)    HEMOGLOBIN A1C  09/15/2024    DIABETIC EYE EXAM  10/21/2024    LIPID PANEL  03/15/2025    ANNUAL WELLNESS VISIT  03/27/2025    DIABETIC FOOT EXAM  03/27/2025    BMI FOLLOWUP  03/27/2025    TDAP/TD VACCINES (2 - Td or Tdap) 05/24/2027    HEPATITIS C SCREENING  Completed    Pneumococcal Vaccine 65+  Completed                  CMS Preventative Services Quick Reference  Risk Factors Identified During Encounter:    None Identified    The above risks/problems have been discussed with the patient.  Pertinent information has been shared with the patient in the After Visit Summary.    Diagnoses and all orders for this visit:    1. Wellness examination (Primary)    2. At moderate risk for fall    3. Essential hypertension    4. Type 2 diabetes mellitus with hyperglycemia, without long-term current use of insulin    5. Mixed hyperlipidemia    6. Type 2 diabetes mellitus without complication, without long-term current use of insulin  -     Microalbumin / Creatinine Urine Ratio - Urine, Clean Catch    7. Encounter for screening mammogram for malignant neoplasm of breast  -     Mammo Screening Bilateral With CAD; Future    8. Post-menopausal  -     DEXA Bone Density Axial    Labs reviewed with patient. Discussed increased A1c, and patient would like to dicusss diet and exercise. Hand outs given for this. Advised that if we are unable to get her A1c below 7, then we will dicsuss med adjustments.     Mammogram ordered, dexa ordered.     Colon cancer screening  up to date.     Urine micro ordered.     Past medical and surgical history as well as allergies, family history and social history were reviewed, and discussed with patient.  Chronic conditions were reviewed as well as medications.   Anticipatory guidance handouts including healthy diet, health maintenance, as well as regular exercise and general instructions were given via [facility], and patient was able to ask questions and discuss any concerns.        Follow Up:   Next Medicare Wellness visit to be scheduled in 1 year.      An After Visit Summary and PPPS were made available to the patient.             [phone]

## 2024-06-23 NOTE — PATIENT PROFILE ADULT. - SOCIAL CONCERNS
Patient : Edwin Mendieta Age: 49 year old Sex: female   MRN: [id number] Encounter Date: 6/23/2024      History     Chief Complaint   Patient presents with    Medical Problem     49-year-old female, chief complaint headache, neck pain, hand pain.  Patient was in her usual state of health until this past week when she underwent injection for her asthma.  She received her first shot of Dupixent.  Shortly thereafter she started having various myalgias and arthralgias.  Today when she awoke she had increasing pain in her hands.  She had been in touch with the pulmonology clinic regarding this possible reaction in the last couple days.  Their advice was to go to the urgent care.        Allergies   Allergen Reactions    Imitrex [Sumatriptan Base] ANAPHYLAXIS     Throat tightness    Codeine NAUSEA and Other (See Comments)     \"sensitive\"    Megace Es HEADACHES    Sulfa Antibiotics Other (See Comments)     hives, dizziness    Zithromax [Azithromycin Dihydrate] HEADACHES and NAUSEA       Discharge Medication List as of 6/23/2024 11:18 AM        Prior to Admission Medications    Details   Dupilumab 300 MG/2ML Solution Pen-injector Initiate specialty medication management? NoInject 2 mLs into the skin every 14 days.Eprescribe, Disp-4 mL, R-11This is for the maintenance dose. Prescription will also be sent for the loading dose. Patient will be enrolling in copay card.      cefdinir (OMNICEF) 300 MG capsule Take 1 capsule by mouth in the morning and 1 capsule in the evening.Eprescribe, Disp-10 capsule, R-0      fluconazole (Diflucan) 150 MG tablet Take 1 tablet at the MCFP point of your antibiotic treatment and then 1 other tablet following completionEprescribe, Disp-2 tablet, R-0      losartan (COZAAR) 25 MG tablet Take 1 tablet by mouth daily.Eprescribe, Disp-90 tablet, R-0      montelukast (Singulair) 10 MG tablet Take 1 tablet by mouth every evening.Eprescribe, Disp-30 tablet, R-5      tiotropium (SPIRIVA RESPIMAT) 2.5 MCG/ACT  inhaler Inhale 2 puffs into the lungs daily.Eprescribe, Disp-4 g, R-3, FOUZIA      albuterol (Ventolin HFA) 108 (90 Base) MCG/ACT inhaler Inhale 2 puffs into the lungs every 4 hours as needed for Shortness of Breath or Wheezing.Eprescribe, Disp-1 each, R-0      budesonide-formoterol (Symbicort) 160-4.5 MCG/ACT inhaler INHALE TWO PUFFS BY MOUTH TWICE A DAYEprescribe, Disp-10.2 g, R-5      cyclobenzaprine (FLEXERIL) 10 MG tablet Take 1 tablet by mouth 3 times daily as needed for Muscle spasms.Eprescribe, Oral, 3 TIMES DAILY PRN, Disp-15 tablet, R-0Consider prescribing in whole tablet strengths, as the tablets can be difficult to break (depends on product carried by the [facility] pharmacy).      Loperamide HCl (IMODIUM A-D PO) Historical Med      Multiple Vitamins-Minerals (AIRBORNE PO) Take by mouth as needed.Historical Med      ondansetron (ZOFRAN ODT) 4 MG disintegrating tablet Place 1 tablet onto the tongue every 8 hours as needed for Nausea.Eprescribe, Disp-10 tablet, R-0      acetaminophen (TYLENOL) 500 MG tablet Take 500 mg by mouth every 6 hours as needed for Pain.Historical Med      Ascorbic Acid (VITAMIN C) 100 MG tablet Take 100 mg by mouth daily.Historical Med      Cholecalciferol (VITAMIN D) 2000 units capsule Take by mouth daily.Historical Med, Oral, DAILYPer the FDA, the units of measure for vitamin D have changed from international units (IUs) to metric units (eg, micrograms or milligrams). It is advised to order in metric units. 50 mcg = 2,000 units      Omega-3 Fatty Acids (FISH OIL) 1000 MG capsule Take 2 g by mouth daily.Historical Med      Lansoprazole (PREVACID PO) Take 15 mg by mouth daily. Takes two per dayHistorical Med           Modified Medications    Details   predniSONE (DELTASONE) 20 MG tablet Take 2 tablets by mouth daily.Eprescribe, Disp-10 tablet, R-0             Past Medical History:   Diagnosis Date    Chronic low back pain     Followed by Pain Management    Gastritis     Herpes  genitalia     Hirsutism     Hypertension Dx 2019    Other chronic cystitis     UTI (recurrent)    PONV (postoperative nausea and vomiting)     RAD (reactive airway disease) (CMD) Dx age 32    Urinary tract infection     WPW (Jocelin-Parkinson-White syndrome)     s/p ablation       Past Surgical History:   Procedure Laterality Date    CHOLECYSTECTOMY      CYSTOURETHROSCOPY      LEFT HEART CATH,PERCUTANEOUS  1997    Cardiac Cath and ablation    MANDIBLE SURGERY      Jaw surgery for severe underbite       Family History   Problem Relation Age of Onset    Gastrointestinal Mother         Stromal tumor    Parkinsonism Father     Cancer, Ovarian Sister 43        Dx age 43, no known genetic testing    Dementia/Alzheimers Maternal Grandmother     Hypertension Maternal Grandmother     Dementia/Alzheimers Maternal Grandfather        Social History     Tobacco Use    Smoking status: Former     Current packs/day: 0.00     Types: Cigarettes     Quit date:      Years since quittin.4     Passive exposure: Current    Smokeless tobacco: Never    Tobacco comments:      smokes in household.  Patient states, she was social smoker..   Vaping Use    Vaping status: never used   Substance Use Topics    Alcohol use: Yes     Comment: Very seldom    Drug use: No       E-cigarette/Vaping    E-Cigarette/Vaping Use Never Used      E-Cigarette/Vaping Substances & Devices       Review of Systems   All other systems reviewed and are negative.      Physical Exam     ED Triage Vitals [24 1055]   ED Triage Vitals Group      Temp 98.2 °F (36.8 °C)      Heart Rate 88      Resp 16      BP (!) 156/79      SpO2 97 %      EtCO2 mmHg       Height       Weight 200 lb (90.7 kg)      Weight Scale Used       BMI (Calculated)       IBW/kg (Calculated)        Physical Exam  Vitals and nursing note reviewed.   Constitutional:       Appearance: Normal appearance. She is obese.   Cardiovascular:      Rate and Rhythm: Normal rate  and regular rhythm.   Pulmonary:      Effort: Pulmonary effort is normal.      Breath sounds: Normal breath sounds.   Musculoskeletal:         General: Swelling present. Normal range of motion.   Neurological:      Mental Status: She is alert.         ED Course     Procedures    Lab Results     No results found for this visit on 06/23/24.    EKG Results       Radiology Results     Imaging Results    None         ED Medication Orders (From admission, onward)      None          Nursing note reviewed.  Vital signs reviewed.  Patient is the primary historian.  I did review her pulmonology notes as well as her email encounters with the nurse describing her possible reaction.  When I evaluated the patient I did reference the medication in [employer] and looked at the side effect profile.  It does include myalgias, arthralgias, headache.  This seems to coincide with the patient's symptoms as well as the onset shortly after receiving the injection.  Patient replaced on a short burst of prednisone.  She will need to discuss with her pulmonologist whether or not to continue this medication.  It is dosed every other week.  Signs and symptoms for reevaluation discussed.       Medical Decision Making      Clinical Impression     ED Diagnosis   1. Adverse effect of drug, initial encounter        2. Arthralgia, unspecified joint            Disposition        Discharge 6/23/2024 11:17 AM  Edwin Mendieta discharge to home/self care.             Jarad Bean MD  06/23/24 1120     None

## 2024-07-02 NOTE — DISCHARGE NOTE ADULT - NSCORESITESY/N_GEN_A_CORE_RD
Kemi Locke  [address] [place] [id number]            7/2/2024      Dear Kemi,    Recently you were referred to our Gastroenterology Department by your primary care provider for a colonoscopy.  I would like to share some important information about a colonoscopy. Colorectal cancer is a leading cause of cancer death in this country. Colorectal cancer can be stopped in its tracks or even prevented with a  colonoscopy.    Please call [phone] to schedule your procedure with one of our Gastroenterology providers at either [facility],  [facility], or  [facility].  If you already spoke to someone in the GI Department and have your procedure scheduled you can disregard this letter.     If you decide to have your colonoscopy elsewhere, please call us at [phone] with the information so that we can update your record.    Your good health is important to us, a colonoscopy is important for you.    Sincerely,      [facility]  [address] [place] [id number]      
No

## 2024-07-02 NOTE — PATIENT PROFILE ADULT. - SW.
Pt present in milieu for short periods of time this Pm. Social with select peers. Compliant with meds. Polite, pleasant however, flat affect. No SI/HI voiced.No behavior issues.   social work

## 2024-07-23 NOTE — ED ADULT NURSE NOTE - CAS TRG GEN SKIN COLOR
Discharge instructions given by provider. Follow up care reviewed. Pt understands instructions and has no questions at this time.     Normal for race

## 2024-08-01 NOTE — ED ADULT NURSE NOTE - TEMPLATE
Report called to Mariaa at [facility] that patient is discharging back to the facility.   
Abdominal Pain, N/V/D

## 2025-03-14 NOTE — PATIENT PROFILE ADULT. - ..
Activities of Daily Living (ADL's):    Scale:     0 - Able to perform activity at the same level as before injury or problem (No Restriction)  10 - Unable to perform activity (Immobilized)    Cervical/Upper Extremity ADL's:  Lifting Objects with Arms:      5  Getting Dressed/Putting on a Shirt:   2  Household Chores (Cooking/Cleaning):   2    Lumbar/Hip/Knee ADL's:  Walking Functionality:     1  Standing Functionality:     2  Walking Up Stairs Functionality:    1    How long are you able to walk without pain:                30 minutes  How long are you able to stand for without pain:             15-20 minutes        ORT:  , on 03/20/2025  Previous 2, on 3/29/2024    Oswestry Disability:   na, on 03/20/2025   Previous NA%, on 12/26/2024       HPI: (Portions of this note are brought forward from Jacques Pascual MD initial note on 03/29/2024; reviewed and edited as appropriate)  Rogelio Montilla is a 37 year old male with chronic right-sided neck pain pain as described below.  Referred by YANG Nogueira for consultation and management.     Pain Score (0-10):  Current 4/10; Worst 8/10;  Least 2/10;  Average 3/10;  Acceptable 5/10  Duration:  2010  Context of pain:  He reports he was injured around 2010 in the [employer], related to helmet, gear, and other duties. He is had right-sided neck and upper back pain since then. He reports extensive physical therapy from [id number] at [facility], with 20-30 episodes of dry needling.  He reports he has a disability rating for his neck and shoulder, and was medically retired in 2019 for chronic fatigue syndrome, migraines, depression, and anxiety. Patient was established with Dr. Park in Orthopedics in 2022 for right-sided neck pain and right shoulder pain. He did not have radicular symptoms at that time. He underwent a right subacromial bursa steroid injection with Dr. Park in 2023 which provided short term benefit. He was re-evaluated by Dr. Park in  February 2024 where patient started having new radicular pain down his right upper extremity to his 2nd-5th digits. [facility]. Due to ongoing right shoulder pain, patient underwent a right glenohumeral joint steroid injection on 03/04/2024 which provided moderate relief, however continues with numbness diffusely throughout his right upper extremity to his hand.      Location:  Cervical spine  Radiation:  right upper extremity to hand  Quality: aching, sharp, and tender  Improves:  nothing in particular  Exacerbates:? lifting arm over head, lateral rotation of neck     Numbness/Tingling:  right upper extremity into right hand   Weakness:  right upper extremity  Sleep:  7 hours  Mood:  depressed, anxious Currently taking Fluoxetine.   ?  Bowel and bladder - Denies new onset incontinence or saddle anesthesia.     SOCIAL HISTORY:  - Alcohol Abuse: No  - Substance Abuse: No      INTERVENTIONS:  1.   Injections:    Hx of Botox Migraine injections by Neurologist  08/22/2023 - Right shoulder steroid injection (Stokes)  09/20/2023 - Right Subacromial/Subdeltoid Bursa steroid injection (Kroncke)  11/14/2023 - Botox Migraine injection (Zweifel)  02/06/2024 - Botox Migraine injection (Zweifel)  03/04/2024 - Right Glenohumeral joint steroid injection (Kroncke) - moderate benefit  05/07/2024 - Interlaminar Epidural Steroid Injection, Cervical, C6-C7, Right Parasagittal (Hanna) 33% relief x 2 days  06/11/2024 - Interlaminar Epidural Steroid Injection, Cervical, C6-C7, right parasagittal approach (Hanna) no significant relief  09/19/2024 - Medial Branch Block, Right, Cervical, C3, C4, and C5, First Diagnostic Block (Hanna) 20% relief  11/8/2024 Trigger Point injection, Right trapezius, rhomboid, paracervicals. that provided 30% relief, 0% functional improvement for 1 day.         2.   Physical Therapy: Not in a structured PT program. Completed a  course of physical therapy in December 2023 with short term relief. Starts a new course of physical therapy next week.      3.   Surgeries (Spine, Joint, related to pain):   04/22/2022 - Left knee arthroscopy with medial patellofemoral ligament reconstruction (Ammon)     4.   Medications (pain/mood/depression): (with doses, duration of use, side effects, etc...)  Current  Tizanidine 4 mg nightly (Hanna)   Tylenol 650 mg daily PRN  Fluoxetine 60 mg daily  Lidocaine 5% patch daily PRN  CBD oil      Previous  Fluoxetine 60 mg daily (2021)  Ibuprofen 600 mg TID PRN (2021)  Medrol Dosepak (2022)  Gabapentin  Pregabalin 100 mg BID     5.   Other:         If on contract (update):  Urine tox screen:  None  Prescription Drug Monitoring Program verified this visit.  Opioid contract: No   05-Aug-2017 06:27:07

## 2025-06-24 NOTE — PATIENT PROFILE ADULT. - SOCIAL CONCERNS
Pt confirmed received my message      ----- Message from Sharad Delgadillo MD sent at 6/23/2025  6:53 PM CDT -----  2/3/2025: INR 2.6.     Continue current dose: Warfarin 4.5 mg 7/7.      3/5/2025: INR 2.0.     Continue current dose: Warfarin 4.5 mg 7/7.      4/8/2025: INR 3.0.     Continue current dose: Warfarin 4.5 mg 7/7.     5/22/2025: INR 1.7.     Increase current dose: Warfarin 9 mg today. Then warfarin 6 mg 2/7 & warfarin 4.5 mg 5/7.     6/9/2025: INR 3.1.     Continue current dose: Warfarin 6 mg 2/7 & warfarin 4.5 mg 5/7.    6/23/2025: INR 3.4.    Reduce current dose: Warfarin 4.5 mg 5/7.     In 2 weeks: Next INR.     Sharad Delgadillo M.D.     ----- Message -----  From: Lab, Background User  Sent: 6/23/2025   4:48 PM CDT  To: Sharad Delgadillo MD    
None